# Patient Record
Sex: FEMALE | Race: WHITE | NOT HISPANIC OR LATINO
[De-identification: names, ages, dates, MRNs, and addresses within clinical notes are randomized per-mention and may not be internally consistent; named-entity substitution may affect disease eponyms.]

---

## 2017-10-14 ENCOUNTER — TRANSCRIPTION ENCOUNTER (OUTPATIENT)
Age: 57
End: 2017-10-14

## 2019-11-18 ENCOUNTER — TRANSCRIPTION ENCOUNTER (OUTPATIENT)
Age: 59
End: 2019-11-18

## 2020-01-31 ENCOUNTER — APPOINTMENT (OUTPATIENT)
Dept: OBGYN | Facility: CLINIC | Age: 60
End: 2020-01-31
Payer: COMMERCIAL

## 2020-01-31 VITALS
HEIGHT: 61.5 IN | SYSTOLIC BLOOD PRESSURE: 94 MMHG | BODY MASS INDEX: 24.98 KG/M2 | WEIGHT: 134 LBS | DIASTOLIC BLOOD PRESSURE: 62 MMHG

## 2020-01-31 DIAGNOSIS — Z84.1 FAMILY HISTORY OF DISORDERS OF KIDNEY AND URETER: ICD-10-CM

## 2020-01-31 DIAGNOSIS — Z83.1 FAMILY HISTORY OF OTHER INFECTIOUS AND PARASITIC DISEASES: ICD-10-CM

## 2020-01-31 DIAGNOSIS — Z78.9 OTHER SPECIFIED HEALTH STATUS: ICD-10-CM

## 2020-01-31 DIAGNOSIS — Z83.3 FAMILY HISTORY OF DIABETES MELLITUS: ICD-10-CM

## 2020-01-31 DIAGNOSIS — Z01.419 ENCOUNTER FOR GYNECOLOGICAL EXAMINATION (GENERAL) (ROUTINE) W/OUT ABNORMAL FINDINGS: ICD-10-CM

## 2020-01-31 PROCEDURE — 99386 PREV VISIT NEW AGE 40-64: CPT

## 2020-02-03 LAB — HPV HIGH+LOW RISK DNA PNL CVX: NOT DETECTED

## 2020-02-07 LAB — CYTOLOGY CVX/VAG DOC THIN PREP: NORMAL

## 2020-02-09 PROBLEM — Z83.1 FAMILY HISTORY OF TYPE C VIRAL HEPATITIS: Status: ACTIVE | Noted: 2020-02-09

## 2020-02-09 PROBLEM — Z84.1 FAMILY HISTORY OF KIDNEY DISEASE: Status: ACTIVE | Noted: 2020-02-09

## 2020-02-09 PROBLEM — Z83.3 FAMILY HISTORY OF TYPE 2 DIABETES MELLITUS: Status: ACTIVE | Noted: 2020-02-09

## 2020-02-09 PROBLEM — Z78.9 DOES NOT USE ILLICIT DRUGS: Status: ACTIVE | Noted: 2020-02-09

## 2020-02-09 PROBLEM — Z78.9 NON-SMOKER: Status: ACTIVE | Noted: 2020-02-09

## 2020-02-09 PROBLEM — Z78.9 SOCIAL ALCOHOL USE: Status: ACTIVE | Noted: 2020-02-09

## 2020-02-09 NOTE — PHYSICAL EXAM
[Acute Distress] : no acute distress [Awake] : awake [Alert] : alert [Mass] : no breast mass [Nipple Discharge] : no nipple discharge [Soft] : soft [Axillary LAD] : no axillary lymphadenopathy [Tender] : non tender [Oriented x3] : oriented to person, place, and time [No Bleeding] : there was no active vaginal bleeding [Normal] : uterus [Uterine Adnexae] : were not tender and not enlarged [External Hemorrhoid] : no external hemorrhoids

## 2020-02-09 NOTE — HISTORY OF PRESENT ILLNESS
[Regular Cycle Intervals] : periods have been regular [Menarche Age: ____] : age at menarche was [unfilled] [Menopause Age: ____] : age at menopause was [unfilled] [Sexually Active] : is sexually active [Monogamous] : is monogamous

## 2020-02-09 NOTE — REVIEW OF SYSTEMS
[Feeling Tired] : feeling tired [Recent Wt Gain ___ Lbs] : recent [unfilled] ~Ulb weight gain [Sight Problems] : sight problems [Sore Throat] : sore throat [Constipation] : constipation [Joint Pain] : joint pain [Joint Stiffness] : joint stiffness [Headache] : headache [Sleep Disturbances] : sleep disturbances [Hot Flashes] : hot flashes [Nl] : Endocrine

## 2021-03-05 ENCOUNTER — TRANSCRIPTION ENCOUNTER (OUTPATIENT)
Age: 61
End: 2021-03-05

## 2021-03-12 ENCOUNTER — TRANSCRIPTION ENCOUNTER (OUTPATIENT)
Age: 61
End: 2021-03-12

## 2021-05-05 ENCOUNTER — TRANSCRIPTION ENCOUNTER (OUTPATIENT)
Age: 61
End: 2021-05-05

## 2021-07-01 ENCOUNTER — RESULT REVIEW (OUTPATIENT)
Age: 61
End: 2021-07-01

## 2021-11-05 ENCOUNTER — RESULT REVIEW (OUTPATIENT)
Age: 61
End: 2021-11-05

## 2022-06-26 ENCOUNTER — NON-APPOINTMENT (OUTPATIENT)
Age: 62
End: 2022-06-26

## 2024-07-18 PROBLEM — R63.4 WEIGHT LOSS: Status: ACTIVE | Noted: 2024-07-18

## 2024-07-22 ENCOUNTER — APPOINTMENT (OUTPATIENT)
Dept: GASTROENTEROLOGY | Facility: HOSPITAL | Age: 64
End: 2024-07-22

## 2024-07-22 ENCOUNTER — NON-APPOINTMENT (OUTPATIENT)
Age: 64
End: 2024-07-22

## 2024-07-23 ENCOUNTER — APPOINTMENT (OUTPATIENT)
Dept: GASTROENTEROLOGY | Facility: CLINIC | Age: 64
End: 2024-07-23
Payer: COMMERCIAL

## 2024-07-23 VITALS
BODY MASS INDEX: 26.31 KG/M2 | SYSTOLIC BLOOD PRESSURE: 89 MMHG | HEART RATE: 78 BPM | TEMPERATURE: 97.1 F | OXYGEN SATURATION: 16 % | WEIGHT: 134 LBS | RESPIRATION RATE: 16 BRPM | HEIGHT: 60 IN | DIASTOLIC BLOOD PRESSURE: 58 MMHG

## 2024-07-23 DIAGNOSIS — R63.4 ABNORMAL WEIGHT LOSS: ICD-10-CM

## 2024-07-23 DIAGNOSIS — R18.0 MALIGNANT ASCITES: ICD-10-CM

## 2024-07-23 DIAGNOSIS — E43 UNSPECIFIED SEVERE PROTEIN-CALORIE MALNUTRITION: ICD-10-CM

## 2024-07-23 DIAGNOSIS — R13.12 DYSPHAGIA, OROPHARYNGEAL PHASE: ICD-10-CM

## 2024-07-23 PROCEDURE — 99205 OFFICE O/P NEW HI 60 MIN: CPT

## 2024-07-28 PROBLEM — R18.0 MALIGNANT ASCITES: Status: ACTIVE | Noted: 2024-07-28

## 2024-07-28 PROBLEM — R13.12 DYSPHAGIA, OROPHARYNGEAL: Status: ACTIVE | Noted: 2024-07-28

## 2024-07-28 PROBLEM — E43 SEVERE PROTEIN-CALORIE MALNUTRITION: Status: ACTIVE | Noted: 2024-07-28

## 2024-07-28 NOTE — PHYSICAL EXAM
[Alert] : alert [No Acute Distress] : no acute distress [Sclera] : the sclera and conjunctiva were normal [Normal Appearance] : the appearance of the neck was normal [No Respiratory Distress] : no respiratory distress [Normal S1, S2] : normal S1 and S2 [Normal Color / Pigmentation] : normal skin color and pigmentation [No Focal Deficits] : no focal deficits [Normal Affect] : the affect was normal [Normal Mood] : the mood was normal [de-identified] : thin

## 2024-07-28 NOTE — CONSULT LETTER
[Dear  ___] : Dear  [unfilled], [Consult Letter:] : I had the pleasure of evaluating your patient, [unfilled]. [Please see my note below.] : Please see my note below. [Consult Closing:] : Thank you very much for allowing me to participate in the care of this patient.  If you have any questions, please do not hesitate to contact me. [Sincerely,] : Sincerely, [FreeTextEntry3] : Sean Connolly MD, MPH, ELIDA, AARONG Chief of Clinical Quality in Gastroenterology, [facility] Associate Chief of Gastroenterology, [facility] Interim Director of Endoscopy, [address], [address] [facility], [id number] 24 hours [phone]

## 2024-07-28 NOTE — REVIEW OF SYSTEMS
[Recent Weight Loss (___ Lbs)] : recent [unfilled] ~Ulb weight loss [Fever] : no fever [Chills] : no chills [Chest Pain] : no chest pain [Shortness Of Breath] : no shortness of breath [FreeTextEntry7] : Recurrent ascites, has peritoneal catheter drain

## 2024-07-28 NOTE — ASSESSMENT
[FreeTextEntry1] : Impression:  #1.  Referred for PEG for malnutrition, weight loss in setting of advanced malignancy  #2.  Oropharyngeal dysphagia, suspected mucositis   #3.  Malignant ascites with drain  Plan:  #1.  ENT evaluation  #2,  EGD/PEG in 17-20 days per med oncology.  Diagram was used for educational purposes.  Elevated risk of complications in the setting of malignant ascites, malnutrition from cancer, and poor wound healing were extensively discussed.  Risks, benefits, alternatives of the procedure were discussed with the patient and family member and they were educated about the procedure. Risks include, but are not limited to, bleeding, infection, injury to internal organs, possible need for further procedures including emergency surgery, leakage of ascites, peritonitis, clogged tube, malposiitioned/dislodged tube, missed lesions, risk of anesthesia, and risk of IV site problems.  Patient and family member understand and agrees to proceed.

## 2024-07-28 NOTE — PHYSICAL EXAM
[Alert] : alert [No Acute Distress] : no acute distress [Sclera] : the sclera and conjunctiva were normal [Normal Appearance] : the appearance of the neck was normal [No Respiratory Distress] : no respiratory distress [Normal S1, S2] : normal S1 and S2 [Normal Color / Pigmentation] : normal skin color and pigmentation [No Focal Deficits] : no focal deficits [Normal Affect] : the affect was normal [Normal Mood] : the mood was normal [de-identified] : thin

## 2024-07-28 NOTE — CONSULT LETTER
[Dear  ___] : Dear  [unfilled], [Consult Letter:] : I had the pleasure of evaluating your patient, [unfilled]. [Please see my note below.] : Please see my note below. [Consult Closing:] : Thank you very much for allowing me to participate in the care of this patient.  If you have any questions, please do not hesitate to contact me. [Sincerely,] : Sincerely, [FreeTextEntry3] : Sean Connolly MD, MPH, ELIDA, AARONG Chief of Clinical Quality in Gastroenterology, [facility] Associate Chief of Gastroenterology, [facility] Interim Director of Endoscopy, [address], [address] [facility], [id number] 24 hours [phone]

## 2024-07-28 NOTE — HISTORY OF PRESENT ILLNESS
[FreeTextEntry1] : Referred for PEG for malnutrition, weight loss in setting of advanced malignancy  Oropharyngeal dysphagia, suspected mucositis   Malignant ascites with drain  No fevers, chills, sweats, abdominal pain, nausea, vomiting, diarrhea, melena, hematochezia, jaundice or weight loss.  No chest pain/dyspnea.

## 2024-07-30 ENCOUNTER — OUTPATIENT (OUTPATIENT)
Dept: OUTPATIENT SERVICES | Facility: HOSPITAL | Age: 64
LOS: 1 days | End: 2024-07-30
Payer: COMMERCIAL

## 2024-07-30 VITALS
DIASTOLIC BLOOD PRESSURE: 59 MMHG | TEMPERATURE: 97 F | OXYGEN SATURATION: 98 % | HEART RATE: 86 BPM | WEIGHT: 82.01 LBS | HEIGHT: 61 IN | SYSTOLIC BLOOD PRESSURE: 84 MMHG | RESPIRATION RATE: 18 BRPM

## 2024-07-30 DIAGNOSIS — Z98.890 OTHER SPECIFIED POSTPROCEDURAL STATES: Chronic | ICD-10-CM

## 2024-07-30 DIAGNOSIS — Z01.818 ENCOUNTER FOR OTHER PREPROCEDURAL EXAMINATION: ICD-10-CM

## 2024-07-30 DIAGNOSIS — E43 UNSPECIFIED SEVERE PROTEIN-CALORIE MALNUTRITION: ICD-10-CM

## 2024-07-30 DIAGNOSIS — R63.4 ABNORMAL WEIGHT LOSS: ICD-10-CM

## 2024-07-30 DIAGNOSIS — M79.89 OTHER SPECIFIED SOFT TISSUE DISORDERS: ICD-10-CM

## 2024-07-30 DIAGNOSIS — D17.9 BENIGN LIPOMATOUS NEOPLASM, UNSPECIFIED: Chronic | ICD-10-CM

## 2024-07-30 DIAGNOSIS — Z95.828 PRESENCE OF OTHER VASCULAR IMPLANTS AND GRAFTS: Chronic | ICD-10-CM

## 2024-07-30 LAB
ALBUMIN SERPL ELPH-MCNC: 3.1 G/DL — LOW (ref 3.3–5)
ALP SERPL-CCNC: 67 U/L — SIGNIFICANT CHANGE UP (ref 40–120)
ALT FLD-CCNC: 14 U/L — SIGNIFICANT CHANGE UP (ref 10–45)
ANION GAP SERPL CALC-SCNC: 9 MMOL/L — SIGNIFICANT CHANGE UP (ref 5–17)
AST SERPL-CCNC: 19 U/L — SIGNIFICANT CHANGE UP (ref 10–40)
BILIRUB SERPL-MCNC: 0.2 MG/DL — SIGNIFICANT CHANGE UP (ref 0.2–1.2)
BUN SERPL-MCNC: 9 MG/DL — SIGNIFICANT CHANGE UP (ref 7–23)
CALCIUM SERPL-MCNC: 9.1 MG/DL — SIGNIFICANT CHANGE UP (ref 8.4–10.5)
CHLORIDE SERPL-SCNC: 105 MMOL/L — SIGNIFICANT CHANGE UP (ref 96–108)
CO2 SERPL-SCNC: 24 MMOL/L — SIGNIFICANT CHANGE UP (ref 22–31)
CREAT SERPL-MCNC: 0.68 MG/DL — SIGNIFICANT CHANGE UP (ref 0.5–1.3)
EGFR: 98 ML/MIN/1.73M2 — SIGNIFICANT CHANGE UP
GLUCOSE SERPL-MCNC: 95 MG/DL — SIGNIFICANT CHANGE UP (ref 70–99)
HCT VFR BLD CALC: 30.2 % — LOW (ref 34.5–45)
HGB BLD-MCNC: 9.5 G/DL — LOW (ref 11.5–15.5)
MCHC RBC-ENTMCNC: 28.6 PG — SIGNIFICANT CHANGE UP (ref 27–34)
MCHC RBC-ENTMCNC: 31.5 GM/DL — LOW (ref 32–36)
MCV RBC AUTO: 91 FL — SIGNIFICANT CHANGE UP (ref 80–100)
NRBC # BLD: 0 /100 WBCS — SIGNIFICANT CHANGE UP (ref 0–0)
PLATELET # BLD AUTO: 265 K/UL — SIGNIFICANT CHANGE UP (ref 150–400)
POTASSIUM SERPL-MCNC: 4.5 MMOL/L — SIGNIFICANT CHANGE UP (ref 3.5–5.3)
POTASSIUM SERPL-SCNC: 4.5 MMOL/L — SIGNIFICANT CHANGE UP (ref 3.5–5.3)
PROT SERPL-MCNC: 5.1 G/DL — LOW (ref 6–8.3)
RBC # BLD: 3.32 M/UL — LOW (ref 3.8–5.2)
RBC # FLD: 23.5 % — HIGH (ref 10.3–14.5)
SODIUM SERPL-SCNC: 138 MMOL/L — SIGNIFICANT CHANGE UP (ref 135–145)
WBC # BLD: 4.08 K/UL — SIGNIFICANT CHANGE UP (ref 3.8–10.5)
WBC # FLD AUTO: 4.08 K/UL — SIGNIFICANT CHANGE UP (ref 3.8–10.5)

## 2024-07-30 PROCEDURE — 36415 COLL VENOUS BLD VENIPUNCTURE: CPT

## 2024-07-30 PROCEDURE — 93970 EXTREMITY STUDY: CPT | Mod: 26

## 2024-07-30 PROCEDURE — 80053 COMPREHEN METABOLIC PANEL: CPT

## 2024-07-30 PROCEDURE — 93970 EXTREMITY STUDY: CPT

## 2024-07-30 PROCEDURE — 85027 COMPLETE CBC AUTOMATED: CPT

## 2024-07-30 PROCEDURE — G0463: CPT

## 2024-07-30 NOTE — H&P PST ADULT - NSICDXPASTMEDICALHX_GEN_ALL_CORE_FT
PAST MEDICAL HISTORY:  Constipation     Dysphagia     Infected venous access port     Lung cancer     Migraine     Severe malnutrition      PAST MEDICAL HISTORY:  Asthma     Constipation     Diverticulosis     Dysphagia     Infected venous access port     Lung cancer     Migraine     Severe malnutrition     Shoulder mass

## 2024-07-30 NOTE — H&P PST ADULT - ASSESSMENT
DASI score: 5.07  DASI activity: helps babysit 2 year old granddaughter, mod house chores - gets fatigued/sob   Loose teeth or denture: denies

## 2024-07-30 NOTE — H&P PST ADULT - NSICDXPASTSURGICALHX_GEN_ALL_CORE_FT
PAST SURGICAL HISTORY:  H/O insertion of central venous access port     History of appendectomy     Lipoma      PAST SURGICAL HISTORY:  H/O insertion of central venous access port     H/O shoulder surgery     History of appendectomy     History of thoracentesis     Lipoma

## 2024-07-30 NOTE — H&P PST ADULT - NSICDXFAMILYHX_GEN_ALL_CORE_FT
FAMILY HISTORY:  Father  Still living? Unknown  FH: HTN (hypertension), Age at diagnosis: Age Unknown     FAMILY HISTORY:  FH: diabetes mellitus    Father  Still living? Unknown  FH: HTN (hypertension), Age at diagnosis: Age Unknown

## 2024-07-30 NOTE — H&P PST ADULT - BP NONINVASIVE SYSTOLIC (MM HG)
[de-identified] : Is S/P second Carboplatin and Taxol, tolerating well, working full time, only one episode of nausea, took compazine and this resolved. [de-identified] : \par Recurrent endometrial cancer with widespread metastatic disease involving the liver, spleen, lung, and peritoneum. \par Initially treated with Carboplatin and Taxol in 2018, recurrent disease in July 2019, retreated with Craboplatin and Taxol.\par  85

## 2024-07-30 NOTE — H&P PST ADULT - PROBLEM SELECTOR PLAN 1
EGD/PEG placement on 8/5/24 with Dr. tSephane Rangel.  Pre-op instructions given. Questions answered. EGD/PEG placement on 8/5/24 with Dr. Stephane Rangel.  Pre-op instructions given. Questions answered.

## 2024-07-30 NOTE — H&P PST ADULT - HISTORY OF PRESENT ILLNESS
62 yo female presents to PST prior to scheduled EGD/Peg placement on 8/5/24 with Dr. Stephane Rangel. Pmhx includes lung cancer since 10/2021; on chemotherapy (2 weeks on 2 weeks off; next treatment is 7/31/24, no treatment week of surgery). Reports unintentional weight loss (40+lbs) over the past 6 months; has intermittent nausea and dysphagia (mira. when has sores in mouth from chemo). Hx of hypotension (84/59 at PST); chronic fatigue and fernandez (reports stable, recent echo with oncologist). Currently, denies pain/sores in mouth, fever, chills, chest pain, shortness of breath at rest, dizziness, syncope.    Upon exam has swelling in BLE, left greater than right. Denies calf pain, redness. BLE venous doppler ordered, will be performed today. 64 yo female presents to PST prior to scheduled EGD/Peg placement on 8/5/24 with Dr. Stephane Rangel. Pmhx includes asthma (pt reports stable, no inhaler use), metastatic lung cancer since 10/2021; on chemotherapy (2 weeks on 2 weeks off; next treatment is 7/31/24, no treatment week of surgery). Reports unintentional weight loss (40+lbs) over the past 6 months; has intermittent nausea and dysphagia (mira. when has sores in mouth from chemo). Hx of hypotension (84/59 at PST); chronic fatigue and fernandez (reports stable, echo on 7/2/24 EF 70%). Currently, denies pain/sores in mouth, fever, chills, chest pain, shortness of breath at rest, dizziness, syncope.    Upon exam has swelling in BLE, left greater than right. Denies calf pain, redness. BLE venous doppler ordered, will be performed today.   * 7/30/24 - Doppler negative for lower extremity DVT. Results sent to oncologist, communicated w/ patient and surgeon.

## 2024-07-30 NOTE — H&P PST ADULT - ADMIT DATE
Abdomen soft, tender mid/lower abdomen. skin in abdominal wound appears erythematous/inflammed with scant non purulent discharge.
30-Jul-2024

## 2024-08-05 ENCOUNTER — INPATIENT (INPATIENT)
Facility: HOSPITAL | Age: 64
LOS: 3 days | Discharge: HOME | DRG: 641 | End: 2024-08-09
Attending: STUDENT IN AN ORGANIZED HEALTH CARE EDUCATION/TRAINING PROGRAM | Admitting: INTERNAL MEDICINE
Payer: COMMERCIAL

## 2024-08-05 ENCOUNTER — APPOINTMENT (OUTPATIENT)
Dept: GASTROENTEROLOGY | Facility: HOSPITAL | Age: 64
End: 2024-08-05

## 2024-08-05 VITALS
SYSTOLIC BLOOD PRESSURE: 95 MMHG | WEIGHT: 82.01 LBS | DIASTOLIC BLOOD PRESSURE: 54 MMHG | OXYGEN SATURATION: 94 % | HEIGHT: 61 IN | HEART RATE: 84 BPM | TEMPERATURE: 97 F | RESPIRATION RATE: 15 BRPM

## 2024-08-05 DIAGNOSIS — C34.90 MALIGNANT NEOPLASM OF UNSPECIFIED PART OF UNSPECIFIED BRONCHUS OR LUNG: ICD-10-CM

## 2024-08-05 DIAGNOSIS — Z29.9 ENCOUNTER FOR PROPHYLACTIC MEASURES, UNSPECIFIED: ICD-10-CM

## 2024-08-05 DIAGNOSIS — D17.9 BENIGN LIPOMATOUS NEOPLASM, UNSPECIFIED: Chronic | ICD-10-CM

## 2024-08-05 DIAGNOSIS — R63.4 ABNORMAL WEIGHT LOSS: ICD-10-CM

## 2024-08-05 DIAGNOSIS — Z93.1 GASTROSTOMY STATUS: ICD-10-CM

## 2024-08-05 DIAGNOSIS — I95.9 HYPOTENSION, UNSPECIFIED: ICD-10-CM

## 2024-08-05 DIAGNOSIS — Z95.828 PRESENCE OF OTHER VASCULAR IMPLANTS AND GRAFTS: Chronic | ICD-10-CM

## 2024-08-05 DIAGNOSIS — Z98.890 OTHER SPECIFIED POSTPROCEDURAL STATES: Chronic | ICD-10-CM

## 2024-08-05 DIAGNOSIS — R13.10 DYSPHAGIA, UNSPECIFIED: ICD-10-CM

## 2024-08-05 DIAGNOSIS — R18.0 MALIGNANT ASCITES: ICD-10-CM

## 2024-08-05 LAB
ALBUMIN SERPL ELPH-MCNC: 0.9 G/DL — LOW (ref 3.3–5)
ALBUMIN SERPL ELPH-MCNC: 2.6 G/DL — LOW (ref 3.3–5)
ALP SERPL-CCNC: 18 U/L — LOW (ref 40–120)
ALP SERPL-CCNC: 56 U/L — SIGNIFICANT CHANGE UP (ref 40–120)
ALT FLD-CCNC: 11 U/L — SIGNIFICANT CHANGE UP (ref 10–45)
ALT FLD-CCNC: <5 U/L — LOW (ref 10–45)
ANION GAP SERPL CALC-SCNC: 11 MMOL/L — SIGNIFICANT CHANGE UP (ref 5–17)
ANION GAP SERPL CALC-SCNC: 5 MMOL/L — SIGNIFICANT CHANGE UP (ref 5–17)
ANISOCYTOSIS BLD QL: SLIGHT — SIGNIFICANT CHANGE UP
AST SERPL-CCNC: 13 U/L — SIGNIFICANT CHANGE UP (ref 10–40)
AST SERPL-CCNC: 6 U/L — LOW (ref 10–40)
BASOPHILS # BLD AUTO: 0.05 K/UL — SIGNIFICANT CHANGE UP (ref 0–0.2)
BASOPHILS # BLD AUTO: SIGNIFICANT CHANGE UP K/UL (ref 0–0.2)
BASOPHILS NFR BLD AUTO: 0.9 % — SIGNIFICANT CHANGE UP (ref 0–2)
BASOPHILS NFR BLD AUTO: SIGNIFICANT CHANGE UP % (ref 0–2)
BILIRUB SERPL-MCNC: 0.2 MG/DL — SIGNIFICANT CHANGE UP (ref 0.2–1.2)
BILIRUB SERPL-MCNC: <0.1 MG/DL — LOW (ref 0.2–1.2)
BUN SERPL-MCNC: 8 MG/DL — SIGNIFICANT CHANGE UP (ref 7–23)
BUN SERPL-MCNC: <4 MG/DL — LOW (ref 7–23)
CALCIUM SERPL-MCNC: 7.8 MG/DL — LOW (ref 8.4–10.5)
CALCIUM SERPL-MCNC: <3 MG/DL — CRITICAL LOW (ref 8.4–10.5)
CHLORIDE SERPL-SCNC: 108 MMOL/L — SIGNIFICANT CHANGE UP (ref 96–108)
CHLORIDE SERPL-SCNC: 132 MMOL/L — HIGH (ref 96–108)
CO2 SERPL-SCNC: 22 MMOL/L — SIGNIFICANT CHANGE UP (ref 22–31)
CO2 SERPL-SCNC: <10 MMOL/L — CRITICAL LOW (ref 22–31)
CREAT SERPL-MCNC: 0.68 MG/DL — SIGNIFICANT CHANGE UP (ref 0.5–1.3)
CREAT SERPL-MCNC: <0.3 MG/DL — LOW (ref 0.5–1.3)
DACRYOCYTES BLD QL SMEAR: SLIGHT — SIGNIFICANT CHANGE UP
EGFR: 120 ML/MIN/1.73M2 — SIGNIFICANT CHANGE UP
EGFR: 98 ML/MIN/1.73M2 — SIGNIFICANT CHANGE UP
ELLIPTOCYTES BLD QL SMEAR: SLIGHT — SIGNIFICANT CHANGE UP
EOSINOPHIL # BLD AUTO: 0 K/UL — SIGNIFICANT CHANGE UP (ref 0–0.5)
EOSINOPHIL # BLD AUTO: SIGNIFICANT CHANGE UP K/UL (ref 0–0.5)
EOSINOPHIL NFR BLD AUTO: 0 % — SIGNIFICANT CHANGE UP (ref 0–6)
EOSINOPHIL NFR BLD AUTO: SIGNIFICANT CHANGE UP % (ref 0–6)
GLUCOSE SERPL-MCNC: 33 MG/DL — CRITICAL LOW (ref 70–99)
GLUCOSE SERPL-MCNC: 83 MG/DL — SIGNIFICANT CHANGE UP (ref 70–99)
HCT VFR BLD CALC: 27.4 % — LOW (ref 34.5–45)
HCT VFR BLD CALC: SIGNIFICANT CHANGE UP (ref 34.5–45)
HGB BLD-MCNC: 8.5 G/DL — LOW (ref 11.5–15.5)
HGB BLD-MCNC: SIGNIFICANT CHANGE UP (ref 11.5–15.5)
IMM GRANULOCYTES NFR BLD AUTO: SIGNIFICANT CHANGE UP % (ref 0–0.9)
LYMPHOCYTES # BLD AUTO: 0.82 K/UL — LOW (ref 1–3.3)
LYMPHOCYTES # BLD AUTO: 15 % — SIGNIFICANT CHANGE UP (ref 13–44)
LYMPHOCYTES # BLD AUTO: SIGNIFICANT CHANGE UP (ref 13–44)
LYMPHOCYTES # BLD AUTO: SIGNIFICANT CHANGE UP K/UL (ref 1–3.3)
MACROCYTES BLD QL: SLIGHT — SIGNIFICANT CHANGE UP
MAGNESIUM SERPL-MCNC: 0.6 MG/DL — CRITICAL LOW (ref 1.6–2.6)
MAGNESIUM SERPL-MCNC: 1.7 MG/DL — SIGNIFICANT CHANGE UP (ref 1.6–2.6)
MANUAL SMEAR VERIFICATION: SIGNIFICANT CHANGE UP
MCHC RBC-ENTMCNC: 28.9 PG — SIGNIFICANT CHANGE UP (ref 27–34)
MCHC RBC-ENTMCNC: 31 GM/DL — LOW (ref 32–36)
MCHC RBC-ENTMCNC: SIGNIFICANT CHANGE UP (ref 27–34)
MCHC RBC-ENTMCNC: SIGNIFICANT CHANGE UP (ref 32–36)
MCV RBC AUTO: 93.2 FL — SIGNIFICANT CHANGE UP (ref 80–100)
MCV RBC AUTO: SIGNIFICANT CHANGE UP (ref 80–100)
MONOCYTES # BLD AUTO: 0.1 K/UL — SIGNIFICANT CHANGE UP (ref 0–0.9)
MONOCYTES # BLD AUTO: SIGNIFICANT CHANGE UP K/UL (ref 0–0.9)
MONOCYTES NFR BLD AUTO: 1.8 % — LOW (ref 2–14)
MONOCYTES NFR BLD AUTO: SIGNIFICANT CHANGE UP % (ref 2–14)
NEUTROPHILS # BLD AUTO: 4.5 K/UL — SIGNIFICANT CHANGE UP (ref 1.8–7.4)
NEUTROPHILS # BLD AUTO: SIGNIFICANT CHANGE UP K/UL (ref 1.8–7.4)
NEUTROPHILS NFR BLD AUTO: 80.5 % — HIGH (ref 43–77)
NEUTROPHILS NFR BLD AUTO: SIGNIFICANT CHANGE UP (ref 43–77)
NEUTS BAND # BLD: 1.8 % — SIGNIFICANT CHANGE UP (ref 0–8)
NRBC # BLD: SIGNIFICANT CHANGE UP /100 WBCS (ref 0–0)
OVALOCYTES BLD QL SMEAR: SLIGHT — SIGNIFICANT CHANGE UP
PHOSPHATE SERPL-MCNC: 1.2 MG/DL — LOW (ref 2.5–4.5)
PHOSPHATE SERPL-MCNC: 3.8 MG/DL — SIGNIFICANT CHANGE UP (ref 2.5–4.5)
PLAT MORPH BLD: NORMAL — SIGNIFICANT CHANGE UP
PLATELET # BLD AUTO: 179 K/UL — SIGNIFICANT CHANGE UP (ref 150–400)
PLATELET # BLD AUTO: SIGNIFICANT CHANGE UP K/UL (ref 150–400)
POIKILOCYTOSIS BLD QL AUTO: SIGNIFICANT CHANGE UP
POTASSIUM SERPL-MCNC: 3.7 MMOL/L — SIGNIFICANT CHANGE UP (ref 3.5–5.3)
POTASSIUM SERPL-MCNC: <2 MMOL/L — CRITICAL LOW (ref 3.5–5.3)
POTASSIUM SERPL-SCNC: 3.7 MMOL/L — SIGNIFICANT CHANGE UP (ref 3.5–5.3)
POTASSIUM SERPL-SCNC: <2 MMOL/L — CRITICAL LOW (ref 3.5–5.3)
PROT SERPL-MCNC: 1.3 G/DL — LOW (ref 6–8.3)
PROT SERPL-MCNC: 4.2 G/DL — LOW (ref 6–8.3)
RBC # BLD: 2.94 M/UL — LOW (ref 3.8–5.2)
RBC # BLD: SIGNIFICANT CHANGE UP M/UL (ref 3.8–5.2)
RBC # FLD: 22.9 % — HIGH (ref 10.3–14.5)
RBC # FLD: SIGNIFICANT CHANGE UP (ref 10.3–14.5)
RBC BLD AUTO: ABNORMAL
SCHISTOCYTES BLD QL AUTO: SLIGHT — SIGNIFICANT CHANGE UP
SODIUM SERPL-SCNC: 141 MMOL/L — SIGNIFICANT CHANGE UP (ref 135–145)
SODIUM SERPL-SCNC: 146 MMOL/L — HIGH (ref 135–145)
WBC # BLD: 5.47 K/UL — SIGNIFICANT CHANGE UP (ref 3.8–10.5)
WBC # BLD: SIGNIFICANT CHANGE UP K/UL (ref 3.8–10.5)
WBC # FLD AUTO: 5.47 K/UL — SIGNIFICANT CHANGE UP (ref 3.8–10.5)
WBC # FLD AUTO: SIGNIFICANT CHANGE UP K/UL (ref 3.8–10.5)

## 2024-08-05 PROCEDURE — 99497 ADVNCD CARE PLAN 30 MIN: CPT | Mod: 25

## 2024-08-05 PROCEDURE — 99221 1ST HOSP IP/OBS SF/LOW 40: CPT | Mod: GC,25

## 2024-08-05 PROCEDURE — 99223 1ST HOSP IP/OBS HIGH 75: CPT

## 2024-08-05 DEVICE — KIT ENDO SAFTEY PEG PULL STD 20 FR ENDOVIVE: Type: IMPLANTABLE DEVICE | Status: FUNCTIONAL

## 2024-08-05 RX ORDER — ACETAMINOPHEN 500 MG
975 TABLET ORAL EVERY 6 HOURS
Refills: 0 | Status: DISCONTINUED | OUTPATIENT
Start: 2024-08-05 | End: 2024-08-05

## 2024-08-05 RX ORDER — GUAIFEN/P-PROPANOLAMIN/CODEINE
1 EXPECTORANT ORAL
Refills: 0 | DISCHARGE

## 2024-08-05 RX ORDER — SENNOSIDES 8.6 MG/1
2 TABLET ORAL AT BEDTIME
Refills: 0 | Status: DISCONTINUED | OUTPATIENT
Start: 2024-08-05 | End: 2024-08-05

## 2024-08-05 RX ORDER — ACETAMINOPHEN 500 MG
650 TABLET ORAL EVERY 6 HOURS
Refills: 0 | Status: DISCONTINUED | OUTPATIENT
Start: 2024-08-05 | End: 2024-08-09

## 2024-08-05 RX ORDER — METOCLOPRAMIDE HCL 5 MG/ML
1 VIAL (ML) INJECTION
Refills: 0 | DISCHARGE

## 2024-08-05 RX ORDER — ONDANSETRON HCL/PF 4 MG/2 ML
4 VIAL (ML) INJECTION EVERY 8 HOURS
Refills: 0 | Status: DISCONTINUED | OUTPATIENT
Start: 2024-08-05 | End: 2024-08-09

## 2024-08-05 RX ORDER — GUAIFEN/P-PROPANOLAMIN/CODEINE
5 EXPECTORANT ORAL AT BEDTIME
Refills: 0 | Status: DISCONTINUED | OUTPATIENT
Start: 2024-08-05 | End: 2024-08-05

## 2024-08-05 RX ORDER — MAGNESIUM SULFATE 500 MG/ML
2 VIAL (ML) INJECTION
Refills: 0 | Status: DISCONTINUED | OUTPATIENT
Start: 2024-08-05 | End: 2024-08-05

## 2024-08-05 RX ORDER — MAGNESIUM, ALUMINUM HYDROXIDE 200-225/5
30 SUSPENSION, ORAL (FINAL DOSE FORM) ORAL EVERY 4 HOURS
Refills: 0 | Status: DISCONTINUED | OUTPATIENT
Start: 2024-08-05 | End: 2024-08-09

## 2024-08-05 RX ORDER — MELATONIN 3 MG
3 TABLET ORAL AT BEDTIME
Refills: 0 | Status: DISCONTINUED | OUTPATIENT
Start: 2024-08-05 | End: 2024-08-09

## 2024-08-05 RX ORDER — MAGNESIUM, ALUMINUM HYDROXIDE 200-225/5
30 SUSPENSION, ORAL (FINAL DOSE FORM) ORAL EVERY 4 HOURS
Refills: 0 | Status: DISCONTINUED | OUTPATIENT
Start: 2024-08-05 | End: 2024-08-05

## 2024-08-05 RX ORDER — PANTOPRAZOLE SODIUM 20 MG/1
40 TABLET, DELAYED RELEASE ORAL DAILY
Refills: 0 | Status: DISCONTINUED | OUTPATIENT
Start: 2024-08-05 | End: 2024-08-09

## 2024-08-05 RX ORDER — ONDANSETRON HCL/PF 4 MG/2 ML
8 VIAL (ML) INJECTION EVERY 8 HOURS
Refills: 0 | Status: DISCONTINUED | OUTPATIENT
Start: 2024-08-05 | End: 2024-08-05

## 2024-08-05 RX ORDER — PANTOPRAZOLE SODIUM 20 MG/1
40 TABLET, DELAYED RELEASE ORAL
Refills: 0 | Status: DISCONTINUED | OUTPATIENT
Start: 2024-08-05 | End: 2024-08-05

## 2024-08-05 RX ORDER — ENOXAPARIN SODIUM 120 MG/.8ML
40 INJECTION SUBCUTANEOUS EVERY 24 HOURS
Refills: 0 | Status: DISCONTINUED | OUTPATIENT
Start: 2024-08-05 | End: 2024-08-09

## 2024-08-05 RX ORDER — METOCLOPRAMIDE HCL 5 MG/ML
10 VIAL (ML) INJECTION EVERY 8 HOURS
Refills: 0 | Status: DISCONTINUED | OUTPATIENT
Start: 2024-08-05 | End: 2024-08-05

## 2024-08-05 RX ORDER — ONDANSETRON HCL/PF 4 MG/2 ML
4 VIAL (ML) INJECTION EVERY 8 HOURS
Refills: 0 | Status: DISCONTINUED | OUTPATIENT
Start: 2024-08-05 | End: 2024-08-05

## 2024-08-05 RX ORDER — LORATADINE 10 MG
17 TABLET,DISINTEGRATING ORAL DAILY
Refills: 0 | Status: DISCONTINUED | OUTPATIENT
Start: 2024-08-05 | End: 2024-08-05

## 2024-08-05 RX ORDER — CLINDAMYCIN PHOSPHATE 150 MG/ML
600 VIAL (ML) INJECTION ONCE
Refills: 0 | Status: DISCONTINUED | OUTPATIENT
Start: 2024-08-05 | End: 2024-08-09

## 2024-08-05 RX ORDER — MELATONIN 3 MG
3 TABLET ORAL AT BEDTIME
Refills: 0 | Status: DISCONTINUED | OUTPATIENT
Start: 2024-08-05 | End: 2024-08-05

## 2024-08-05 RX ORDER — CALCIUM CARBONATE 500(1250)
1 TABLET ORAL
Refills: 0 | DISCHARGE

## 2024-08-05 RX ORDER — SENNOSIDES 8.6 MG/1
2 TABLET ORAL
Refills: 0 | DISCHARGE

## 2024-08-05 RX ORDER — GUAIFEN/P-PROPANOLAMIN/CODEINE
5 EXPECTORANT ORAL AT BEDTIME
Refills: 0 | Status: DISCONTINUED | OUTPATIENT
Start: 2024-08-05 | End: 2024-08-09

## 2024-08-05 RX ADMIN — Medication 650 MILLIGRAM(S): at 22:23

## 2024-08-05 RX ADMIN — Medication 650 MILLIGRAM(S): at 23:23

## 2024-08-05 NOTE — H&P ADULT - PROBLEM SELECTOR PLAN 3
alert
Reports progressing through 3 lines of therapy, now on an experiemental agent  - [place] [place]

## 2024-08-05 NOTE — H&P ADULT - PROBLEM SELECTOR PLAN 1
Per patient and , her oncologist Dr. Stanford ([facility] [place]) recommended PEG placement in the setting of malnutrition and rapid weight loss; the patient does report that she does not have difficulty swallowing and normally is able to consume a soft diet and water  - per the patient, will continue PO diet for now  - supplemental tube feedings to commence following nutrition recs, nutrition consult to be placed
What causes cough, runny nose, and other symptoms of the common cold?  These symptoms are usually caused by a virus. Doctors also use the term "viral upper respiratory infection" or "viral URI." Lots of different viruses can get into your nose, mouth, throat, or airways and cause cold symptoms.    Most people get better from a cold without any lasting problems. Even so, having a cold can be uncomfortable.    What are the symptoms of the common cold?  Symptoms can include:    ?Sneezing    ?Coughing    ?Sniffling and runny nose    ?Sore throat    ?Chest congestion    In children, the common cold can also cause a fever. But adults do not usually get a fever when they have a cold.    Colds usually last about 3 to 7 days in adults and 10 days in children. But some people have symptoms for up to 2 weeks.    How can I tell if I have a cold or something else?  Sometimes, it can be hard to tell if you have a cold or something else. Some cold symptoms can also be caused by other illnesses, such as coronavirus disease 2019 ("COVID-19"), the flu, or strep throat.    There are sometimes clues that can help you tell the difference:    ?COVID-19 often starts out very similar to a cold, although it can also cause a fever. If you have cold symptoms and have been around someone with COVID-19, you should get a test to find out if you have it, too.    ?The flu is more likely to cause fever, body aches, and extreme tiredness than a cold.    ?Strep throat usually causes severe throat pain. It can also cause a fever and swollen glands in the neck. People with strep throat usually do not have other cold symptoms like a stuffy nose or cough.    If you think that you might have an illness other than the common cold, call your doctor or nurse. They can tell you what to do.    Can medicine help with a cold?  Usually, a cold gets better on its own and does not need treatment. Because colds are usually caused by viruses, antibiotics will not help.    If you are a teen or an adult, you can try cough and cold medicines that you can get without a prescription. These medicines might help with your symptoms. But they can't cure your cold, or help you get well faster.    If you decide to try non-prescription cold medicines:    ?Read the directions on the label, and follow them carefully.    ?Do not combine 2 or more medicines that have acetaminophen in them. If you take too much acetaminophen, it can damage your liver.    ?If you have a heart condition, have high blood pressure, or take any prescription medicines, talk to your doctor or pharmacist before taking cold medicine. They can tell you which medicines are safe.    Some medicines are not safe for children:    ?If your child is younger than 6, do not give them any cold medicines. These medicines are not safe for young children. Even if your child is older than 6, cough and cold medicines are unlikely to help.    ?Never give aspirin to any child younger than 18 years old. In children, aspirin can cause a life-threatening condition called Reye syndrome.    ?When giving your child acetaminophen or other non-prescription medicines, never give more than the recommended dose.    Is there anything I can do on my own to feel better?  Yes. You can:    ?Get plenty of rest.    ?Drink lots of fluids (water, juice, or broth) to stay hydrated. This will help replace any fluids lost if you have a runny nose or sweating from a fever. Warm tea or soup can help soothe a sore throat.    ?If the air in your home feels dry, use a cool-mist humidifier. This can help a stuffy nose and make it easier to breathe.    ?Use saline nose drops or spray to relieve stuffiness.    ?Avoid smoking, and stay away from places where people are smoking.    Can the common cold lead to more serious problems?  In some cases, yes. In some people, having a cold can lead to:    ?Ear infections    ?Worse asthma symptoms    ?Sinus infections    ?Pneumonia or bronchitis (infections of the lungs)    Can colds be prevented?  There are some things you can do to keep germs from spreading:    ?Wash your hands with soap and water often – This can also help prevent the spread of other illnesses like the flu and COVID-19. The table has instructions on how to wash your hands to prevent spreading illness (table 1).    ?Cover your cough – Cough into your elbow instead of your hands. Teach children to do this, too. Throw away used tissues right away.    ?Clean surfaces – The germs that cause the common cold can live on tables, door handles, and other surfaces for at least 2 hours.    ?Stay home if you are sick – When you do need to be around other people, consider wearing a face mask until you are feeling better.    When should I call the doctor?  Contact your doctor or nurse if you:    ?Lose your sense of taste or smell    ?Have a fever of more than 100.4°F (38°C) that comes with shaking chills, loss of appetite, or trouble breathing    ?Have a very bad sore throat    ?Have a fever and also have lung disease, such as emphysema or asthma    ?Have a cough that lasts longer than 10 days or starts getting worse    ?Have chest pain when you cough or breathe deeply, have trouble breathing, or cough up blood    If you are older than 65, or if you have any chronic medical conditions such as diabetes, contact your doctor or nurse any time you get a long-lasting cough.    Take your child to the emergency department if they:    ?Become confused or stop responding to you    ?Have trouble breathing or have to work hard to breathe    Contact your child's doctor or nurse if the child:    ?Loses their sense of taste or smell or won't eat foods that they ate before    ?Has a very bad sore throat    ?Refuses to drink anything for a long time    ?Is younger than 4 months    ?Has a fever and is not acting like themselves    ?Has a cough that lasts for more than 2 weeks and is not getting any better or is getting worse    ?Has a stuffed or runny nose that gets worse or does not get any better after 10 days    ?Has red eyes or yellow goop coming out of their eyes    ?Has ear pain, pulls at their ears, or shows other signs of having an ear infection

## 2024-08-05 NOTE — H&P ADULT - ASSESSMENT
63F with a history of lung cancer (currently receiving experimental therapy) who presented for an outpatient PEG tube placement, being monitored for tube feed initiation and home care set up.

## 2024-08-05 NOTE — H&P ADULT - NSICDXFAMILYHX_GEN_ALL_CORE_FT
FAMILY HISTORY:  FH: diabetes mellitus    Father  Still living? Unknown  FH: HTN (hypertension), Age at diagnosis: Age Unknown

## 2024-08-05 NOTE — H&P ADULT - HISTORY OF PRESENT ILLNESS
63F with a history of lung cancer (currently receiving experimental therapy) who presented for an outpatient PEG tube placement. She is now admitted to medicine for further monitoring while tube feeds are initiated, and home care is arranged. She reports that she had been undergoing rapid weight loss (~20 lb over the past few months), and her oncologist had suggested she get a PEG tube. She reports pain with swallowing, though denies dysphagia. States she normally eats soft foods and is able to swallow water without difficulty. She was diagnosed with lung cancer in 2021 - she has progressed through 3 lines of therapy and is now on an experimental agent. She's unsure if it is metastatic. She reports placement of a PleurX in the setting of malignant ascites - states that she drains ~500ccs of fluid daily (reportedly there is more fluid remaining, though her provider's have advised her to not drain more than 500 ccs). She denies acute complaints at this time. 63F with a history of lung cancer (currently receiving experimental therapy) who presented for an outpatient PEG tube placement. She is now admitted to medicine for further monitoring while tube feeds are initiated, and home care is arranged. She reports that she had been undergoing rapid weight loss (~20 lb over the past few months), and her oncologist had suggested she get a PEG tube. She reports pain with swallowing, though denies dysphagia. States she normally eats soft foods and is able to swallow water without difficulty. She was diagnosed with lung cancer in 2021 - she has progressed through 3 lines of therapy and is now on an experimental agent. She's unsure if it is metastatic. She reports placement of a PleurX in the setting of malignant ascites - states that she drains ~500ccs of fluid daily (reportedly there is more fluid remaining, though her providers have advised her to not drain more than 500 ccs). She denies acute complaints at this time.

## 2024-08-05 NOTE — PRE PROCEDURE NOTE - PROCEDURE SERVICE
Endoscopy

## 2024-08-05 NOTE — H&P ADULT - PROBLEM SELECTOR PLAN 2
Reports odynophagia, unclear etiology - possibly in the setting of chemo-induced mucositis  - magic mouthwash x1 day   - Tylenol PRN for pain  - soft diet as above

## 2024-08-05 NOTE — H&P ADULT - PROBLEM SELECTOR PLAN 6
DVT ppx - Lovenox (high risk for DVT given malignancy)  Diet - soft with thin liquids; nutrition consult pending for PEG tube feeding recs  Dispo - home once services are arranged to help with tube feeds  Code status - DNR/DNI

## 2024-08-05 NOTE — PRE PROCEDURE NOTE - PRE PROCEDURE EVALUATION
Attending Physician:                            Procedure: EGD with PEG placement    Indication for Procedure: Malnutrition  ________________________________________________________  PAST MEDICAL & SURGICAL HISTORY:  Lung cancer      Constipation      Migraine      Infected venous access port      Dysphagia      Severe malnutrition      Diverticulosis      Shoulder mass      Asthma      History of appendectomy      Lipoma      H/O insertion of central venous access port      History of thoracentesis      H/O shoulder surgery        ALLERGIES:  Eggplant (Rash)  No Known Drug Allergies  cefazolin (Nausea)    HOME MEDICATIONS:  Ambien 5 mg oral tablet: 1 tab(s) orally once a day (at bedtime) as needed for  insomnia  metoclopramide 10 mg oral tablet: 1 tab(s) orally every 6 hours as needed for  nausea  MiraLax oral powder for reconstitution: 17 gram(s) orally once a day as needed for  constipation  omeprazole 40 mg oral delayed release capsule: 1 cap(s) orally once a day  ondansetron 8 mg oral tablet: 1 tab(s) orally every 8 hours as needed for  nausea  senna: 2 tablet with sensor once a day as needed for  constipation  Tums 500 mg oral tablet, chewable: 1 tab(s) chewed once a day  Tylenol 500 mg oral tablet: 2 tab(s) orally every 6 hours    AICD/PPM: [ ] yes   [ ] no    PERTINENT LAB DATA:                      PHYSICAL EXAMINATION:    Height (cm): 154.9  Weight (kg): 37.2  BMI (kg/m2): 15.5  BSA (m2): 1.29T(C): 36.3  HR: 84  BP: 95/54  RR: 15  SpO2: 94%    Constitutional: NAD    Neck:  No JVD  Respiratory: CTAB/L  Cardiovascular: S1 and S2  Gastrointestinal: BS+, soft, NT/ND  Extremities: No peripheral edema  Neurological: A/O x 3, no focal deficits        COMMENTS:    The patient is a suitable candidate for the planned procedure unless box checked [ ]  No, explain:

## 2024-08-05 NOTE — H&P ADULT - PROBLEM SELECTOR PLAN 4
Reports she drains ~500cc of fluid daily (was advised to not drain more than this, possibly because causes her to become hypotensive?)  - ensure nursing is aware to drain PleurX daily

## 2024-08-05 NOTE — CONSULT NOTE ADULT - ATTENDING COMMENTS
As above.    Patient seen and examined in the early afternoon.  Discussed with GI fellow earlier in the day.    Impression:    #1.  Metastatic lung cancer on palliative treatment, with malnutrition, episodes of mucositis, and malignant ascites status post indwelling percutaneous drain which allows for self removal of ascites  #2.  Had outpatient upper endoscopy with PEG placement today 8/5/24, admitted postprocedure for observation, nutrition consultation, nursing teaching of PEG care.    Recommendations:    #1.  May have flushes and medications via G-tube 4 hours post endoscopic placement.  #2.  Clear liquid diet now, may advance to previous diet tomorrow 8/6/2024 if feeling well  #3.  Post PEG clinical observation, nutrition consultation, nursing teaching of PEG care.  #4.  Followup in my GI office on 8/13/24 to assess and loosen PEG bumper.  #5.  See [facility] note for details, including followup arrangements.

## 2024-08-05 NOTE — H&P ADULT - NSHPPHYSICALEXAM_GEN_ALL_CORE
LOS:     VITALS:   T(C): 36.3 (08-05-24 @ 13:13), Max: 36.3 (08-05-24 @ 12:29)  HR: 69 (08-05-24 @ 15:03) (69 - 84)  BP: 82/50 (08-05-24 @ 15:03) (76/44 - 105/56)  RR: 19 (08-05-24 @ 15:03) (15 - 20)  SpO2: 100% (08-05-24 @ 15:03) (94% - 100%)    GENERAL: NAD, lying in bed comfortably, frail and cachectic   HEAD:  Atraumatic, Normocephalic  EYES: EOMI, PERRLA, conjunctiva and sclera clear  ENT: Moist mucous membranes, +mucositis  NECK: Supple, No JVD  CHEST/LUNG: Clear to auscultation bilaterally; No rales, rhonchi, wheezing, or rubs. Unlabored respirations  HEART: Regular rate and rhythm; No murmurs, rubs, or gallops  ABDOMEN: BSx4; Soft, mildly tender, moderately distended with ascites; both PEG tube and PLeurX sites are C/D/I  EXTREMITIES:  2+ Peripheral Pulses, brisk capillary refill. No clubbing, or cyanosis, +peripheral edema  NERVOUS SYSTEM:  A&Ox3, no focal deficits   SKIN: No rashes or lesions appreciated

## 2024-08-05 NOTE — ASU PATIENT PROFILE, ADULT - NSICDXPASTMEDICALHX_GEN_ALL_CORE_FT
PAST MEDICAL HISTORY:  Asthma     Constipation     Diverticulosis     Dysphagia     Infected venous access port     Lung cancer     Migraine     Severe malnutrition     Shoulder mass

## 2024-08-05 NOTE — ASU PATIENT PROFILE, ADULT - NSICDXPASTSURGICALHX_GEN_ALL_CORE_FT
PAST SURGICAL HISTORY:  H/O insertion of central venous access port     H/O shoulder surgery     History of appendectomy     History of thoracentesis     Lipoma

## 2024-08-05 NOTE — PRE-ANESTHESIA EVALUATION ADULT - NSANTHOSAYNRD_GEN_A_CORE
No. PRABHU screening performed.  STOP BANG Legend: 0-2 = LOW Risk; 3-4 = INTERMEDIATE Risk; 5-8 = HIGH Risk

## 2024-08-05 NOTE — H&P ADULT - NSHPREVIEWOFSYSTEMS_GEN_ALL_CORE
REVIEW OF SYSTEMS:    CONSTITUTIONAL: +weakness, no fevers or chills  EYES: No visual changes; no sclera icterics, no pain or drainage  ENT:  No vertigo or throat pain   NECK: No pain or stiffness  RESPIRATORY: No cough, wheezing, hemoptysis; No shortness of breath  CARDIOVASCULAR: No chest pain or palpitations  GASTROINTESTINAL: No abdominal or epigastric pain. No nausea, vomiting, or hematemesis; No diarrhea or constipation. No melena or hematochezia.  GENITOURINARY: No dysuria, frequency or hematuria  NEUROLOGICAL: No numbness or weakness  SKIN: No itching, rashes  Psych: No anxiety or depression

## 2024-08-05 NOTE — H&P ADULT - CONVERSATION DETAILS
Discussed with patient what her wishes would be were she to undergo cardiac arrest. She stated she would prefer to pass naturally, stating if she were to undergo CPR and placed on a ventilator that it would not be a quality of life she would be ok with, believing there would be "no point" living in such a state. She additionally stated she would not want to undergo the trauma of intubation were she to develop respiratory failure. She would be ok receiving dialysis in the event of kidney failure if there was a meaningful chance of recovery.

## 2024-08-06 LAB
ALBUMIN SERPL ELPH-MCNC: 2.2 G/DL — LOW (ref 3.3–5)
ALP SERPL-CCNC: 50 U/L — SIGNIFICANT CHANGE UP (ref 40–120)
ALT FLD-CCNC: 8 U/L — LOW (ref 10–45)
ANION GAP SERPL CALC-SCNC: 7 MMOL/L — SIGNIFICANT CHANGE UP (ref 5–17)
AST SERPL-CCNC: 14 U/L — SIGNIFICANT CHANGE UP (ref 10–40)
BASOPHILS # BLD AUTO: 0.05 K/UL — SIGNIFICANT CHANGE UP (ref 0–0.2)
BASOPHILS NFR BLD AUTO: 1.1 % — SIGNIFICANT CHANGE UP (ref 0–2)
BILIRUB SERPL-MCNC: 0.3 MG/DL — SIGNIFICANT CHANGE UP (ref 0.2–1.2)
BUN SERPL-MCNC: 7 MG/DL — SIGNIFICANT CHANGE UP (ref 7–23)
CALCIUM SERPL-MCNC: 8.1 MG/DL — LOW (ref 8.4–10.5)
CHLORIDE SERPL-SCNC: 109 MMOL/L — HIGH (ref 96–108)
CO2 SERPL-SCNC: 22 MMOL/L — SIGNIFICANT CHANGE UP (ref 22–31)
CREAT SERPL-MCNC: 0.73 MG/DL — SIGNIFICANT CHANGE UP (ref 0.5–1.3)
EGFR: 93 ML/MIN/1.73M2 — SIGNIFICANT CHANGE UP
EOSINOPHIL # BLD AUTO: 0.06 K/UL — SIGNIFICANT CHANGE UP (ref 0–0.5)
EOSINOPHIL NFR BLD AUTO: 1.3 % — SIGNIFICANT CHANGE UP (ref 0–6)
GLUCOSE SERPL-MCNC: 74 MG/DL — SIGNIFICANT CHANGE UP (ref 70–99)
HCT VFR BLD CALC: 23.4 % — LOW (ref 34.5–45)
HCT VFR BLD CALC: 25.1 % — LOW (ref 34.5–45)
HGB BLD-MCNC: 7.1 G/DL — LOW (ref 11.5–15.5)
HGB BLD-MCNC: 8 G/DL — LOW (ref 11.5–15.5)
IMM GRANULOCYTES NFR BLD AUTO: 0.4 % — SIGNIFICANT CHANGE UP (ref 0–0.9)
LYMPHOCYTES # BLD AUTO: 1.14 K/UL — SIGNIFICANT CHANGE UP (ref 1–3.3)
LYMPHOCYTES # BLD AUTO: 25.5 % — SIGNIFICANT CHANGE UP (ref 13–44)
MAGNESIUM SERPL-MCNC: 1.7 MG/DL — SIGNIFICANT CHANGE UP (ref 1.6–2.6)
MCHC RBC-ENTMCNC: 28.3 PG — SIGNIFICANT CHANGE UP (ref 27–34)
MCHC RBC-ENTMCNC: 29.1 PG — SIGNIFICANT CHANGE UP (ref 27–34)
MCHC RBC-ENTMCNC: 30.3 GM/DL — LOW (ref 32–36)
MCHC RBC-ENTMCNC: 31.9 GM/DL — LOW (ref 32–36)
MCV RBC AUTO: 91.3 FL — SIGNIFICANT CHANGE UP (ref 80–100)
MCV RBC AUTO: 93.2 FL — SIGNIFICANT CHANGE UP (ref 80–100)
MONOCYTES # BLD AUTO: 0.11 K/UL — SIGNIFICANT CHANGE UP (ref 0–0.9)
MONOCYTES NFR BLD AUTO: 2.5 % — SIGNIFICANT CHANGE UP (ref 2–14)
NEUTROPHILS # BLD AUTO: 3.09 K/UL — SIGNIFICANT CHANGE UP (ref 1.8–7.4)
NEUTROPHILS NFR BLD AUTO: 69.2 % — SIGNIFICANT CHANGE UP (ref 43–77)
NRBC # BLD: 0 /100 WBCS — SIGNIFICANT CHANGE UP (ref 0–0)
NRBC # BLD: 0 /100 WBCS — SIGNIFICANT CHANGE UP (ref 0–0)
PHOSPHATE SERPL-MCNC: 3.8 MG/DL — SIGNIFICANT CHANGE UP (ref 2.5–4.5)
PLATELET # BLD AUTO: 164 K/UL — SIGNIFICANT CHANGE UP (ref 150–400)
PLATELET # BLD AUTO: 178 K/UL — SIGNIFICANT CHANGE UP (ref 150–400)
POTASSIUM SERPL-MCNC: 3.7 MMOL/L — SIGNIFICANT CHANGE UP (ref 3.5–5.3)
POTASSIUM SERPL-SCNC: 3.7 MMOL/L — SIGNIFICANT CHANGE UP (ref 3.5–5.3)
PROT SERPL-MCNC: 3.7 G/DL — LOW (ref 6–8.3)
RBC # BLD: 2.51 M/UL — LOW (ref 3.8–5.2)
RBC # BLD: 2.75 M/UL — LOW (ref 3.8–5.2)
RBC # FLD: 23 % — HIGH (ref 10.3–14.5)
RBC # FLD: 23.1 % — HIGH (ref 10.3–14.5)
SODIUM SERPL-SCNC: 138 MMOL/L — SIGNIFICANT CHANGE UP (ref 135–145)
WBC # BLD: 4.47 K/UL — SIGNIFICANT CHANGE UP (ref 3.8–10.5)
WBC # BLD: 5.11 K/UL — SIGNIFICANT CHANGE UP (ref 3.8–10.5)
WBC # FLD AUTO: 4.47 K/UL — SIGNIFICANT CHANGE UP (ref 3.8–10.5)
WBC # FLD AUTO: 5.11 K/UL — SIGNIFICANT CHANGE UP (ref 3.8–10.5)

## 2024-08-06 PROCEDURE — 99233 SBSQ HOSP IP/OBS HIGH 50: CPT

## 2024-08-06 RX ADMIN — PANTOPRAZOLE SODIUM 40 MILLIGRAM(S): 20 TABLET, DELAYED RELEASE ORAL at 18:34

## 2024-08-06 RX ADMIN — ENOXAPARIN SODIUM 40 MILLIGRAM(S): 120 INJECTION SUBCUTANEOUS at 05:16

## 2024-08-06 NOTE — DIETITIAN INITIAL EVALUATION ADULT - PERTINENT LABORATORY DATA
08-06    138  |  109<H>  |  7   ----------------------------<  74  3.7   |  22  |  0.73    Ca    8.1<L>      06 Aug 2024 06:58  Phos  3.8     08-06  Mg     1.7     08-06    TPro  3.7<L>  /  Alb  2.2<L>  /  TBili  0.3  /  DBili  x   /  AST  14  /  ALT  8<L>  /  AlkPhos  50  08-06

## 2024-08-06 NOTE — PROGRESS NOTE ADULT - SUBJECTIVE AND OBJECTIVE BOX
Gastroenterology/Hepatology Progress Note      Interval Events:     - Patient underwent EGD/PEG placement on 8/5.   - Has been tolerating PO diet well.   - Reported mild abd discomfort but no nausea or vomiting. Only on clear diet at this time.   - No fevers or chills.   - Hgb decreased from 9.5 to 7.1, but denied over signs of GI bleeding.     Allergies:  Eggplant (Rash)  No Known Drug Allergies  cefazolin (Nausea)      Hospital Medications:  acetaminophen     Tablet .. 650 milliGRAM(s) Oral every 6 hours PRN  aluminum hydroxide/magnesium hydroxide/simethicone Suspension 30 milliLiter(s) Oral every 4 hours PRN  clindamycin IVPB 600 milliGRAM(s) IV Intermittent once  enoxaparin Injectable 40 milliGRAM(s) SubCutaneous every 24 hours  melatonin 3 milliGRAM(s) Oral at bedtime PRN  ondansetron Injectable 4 milliGRAM(s) IV Push every 8 hours PRN  pantoprazole   Suspension 40 milliGRAM(s) Oral daily  zolpidem 5 milliGRAM(s) Oral at bedtime PRN      ROS: 14 point ROS negative unless otherwise state in subjective    PHYSICAL EXAM:   Vital Signs:  Vital Signs Last 24 Hrs  T(C): 36.5 (06 Aug 2024 08:35), Max: 36.5 (06 Aug 2024 08:35)  T(F): 97.7 (06 Aug 2024 08:35), Max: 97.7 (06 Aug 2024 08:35)  HR: 77 (06 Aug 2024 08:35) (69 - 84)  BP: 89/58 (06 Aug 2024 08:35) (76/44 - 105/56)  BP(mean): --  RR: 18 (06 Aug 2024 08:35) (15 - 20)  SpO2: 100% (06 Aug 2024 08:35) (94% - 100%)    Parameters below as of 06 Aug 2024 08:35  Patient On (Oxygen Delivery Method): room air      Daily Height in cm: 154.94 (05 Aug 2024 13:13)    Daily     PHYSICAL EXAM:     GENERAL:  No acute distress, cachectic, chronically ill appearing, lying in bed.   HEENT:  NCAT, no scleral icterus   CHEST:  no respiratory distress  HEART:  Regular rate and rhythm  ABDOMEN:  Soft, has PEG tube in place, external bumper at 1.5 cm, with no bleeding at the site, mild tenderness at the site, no drainage around the site, non distended, also has peritoneal drain placed in the RUQ.   EXTREMITIES: No LE edema  SKIN:  No rash/erythema/ecchymoses/petechiae/wounds/abscess/warm/dry  NEURO:  Alert and oriented x 3, no tremors.     LABS:                        7.1    4.47  )-----------( 164      ( 06 Aug 2024 07:00 )             23.4     Mean Cell Volume: 93.2 fl (08-06-24 @ 07:00)    08-06    138  |  109<H>  |  7   ----------------------------<  74  3.7   |  22  |  0.73    Ca    8.1<L>      06 Aug 2024 06:58  Phos  3.8     08-06  Mg     1.7     08-06    TPro  3.7<L>  /  Alb  2.2<L>  /  TBili  0.3  /  DBili  x   /  AST  14  /  ALT  8<L>  /  AlkPhos  50  08-06    LIVER FUNCTIONS - ( 06 Aug 2024 06:58 )  Alb: 2.2 g/dL / Pro: 3.7 g/dL / ALK PHOS: 50 U/L / ALT: 8 U/L / AST: 14 U/L / GGT: x             Urinalysis Basic - ( 06 Aug 2024 06:58 )    Color: x / Appearance: x / SG: x / pH: x  Gluc: 74 mg/dL / Ketone: x  / Bili: x / Urobili: x   Blood: x / Protein: x / Nitrite: x   Leuk Esterase: x / RBC: x / WBC x   Sq Epi: x / Non Sq Epi: x / Bacteria: x            Imaging:    Impression:          - Gastroesophageal flap valve classified as Hill Grade IV (no fold, wide open                        lumen, hiatal hernia present).                       - 3 cm hiatal hernia.                       - Normal stomach.                       - Normal first portion of the duodenum.                       - Duodenal mucosal atrophy.                       - An externally removable 20 Fr PEG placement was successfully completed.                       - No specimens collected.

## 2024-08-06 NOTE — DIETITIAN INITIAL EVALUATION ADULT - PROBLEM SELECTOR PLAN 1
Per patient and , her oncologist Dr. Stanford ([facility] [place]) recommended PEG placement in the setting of malnutrition and rapid weight loss; the patient does report that she does not have difficulty swallowing and normally is able to consume a soft diet and water  - per the patient, will continue PO diet for now  - supplemental tube feedings to commence following nutrition recs, nutrition consult to be placed [As Noted in HPI] : as noted in HPI [Negative] : Heme/Lymph

## 2024-08-06 NOTE — DIETITIAN INITIAL EVALUATION ADULT - ORAL INTAKE PTA/DIET HISTORY
Pt with poor PO intake PTA, especially on Chemo therapy weeks. Allergy to eggplant confirmed. Reports eating softer foods when she had an appetite. Endorses constipation.

## 2024-08-06 NOTE — DIETITIAN INITIAL EVALUATION ADULT - PROBLEM SELECTOR PLAN 3
Reports progressing through 3 lines of therapy, now on an experiemental agent  - ensure continued follow up with Dr. Stanford ([facility] [place])

## 2024-08-06 NOTE — PROGRESS NOTE ADULT - SUBJECTIVE AND OBJECTIVE BOX
SUBJECTIVE / OVERNIGHT EVENTS:  Today is hospital day 1d. There are no new issues or overnight events.   Did not endorse any headache, lightheadedness, vertigo, shortness of breathe, cough, chest pain, palpitations, tachycardia, abdominal pain, currently    HPI:  63F with a history of lung cancer (currently receiving experimental therapy) who presented for an outpatient PEG tube placement. She is now admitted to medicine for further monitoring while tube feeds are initiated, and home care is arranged. She reports that she had been undergoing rapid weight loss (~20 lb over the past few months), and her oncologist had suggested she get a PEG tube. She reports pain with swallowing, though denies dysphagia. States she normally eats soft foods and is able to swallow water without difficulty. She was diagnosed with lung cancer in 2021 - she has progressed through 3 lines of therapy and is now on an experimental agent. She's unsure if it is metastatic. She reports placement of a PleurX in the setting of malignant ascites - states that she drains ~500ccs of fluid daily (reportedly there is more fluid remaining, though her providers have advised her to not drain more than 500 ccs). She denies acute complaints at this time. (05 Aug 2024 15:07)    MEDICATIONS  (STANDING):  clindamycin IVPB 600 milliGRAM(s) IV Intermittent once  enoxaparin Injectable 40 milliGRAM(s) SubCutaneous every 24 hours  pantoprazole   Suspension 40 milliGRAM(s) Oral daily    MEDICATIONS  (PRN):  acetaminophen     Tablet .. 650 milliGRAM(s) Oral every 6 hours PRN Temp greater or equal to 38C (100.4F), Mild Pain (1 - 3)  aluminum hydroxide/magnesium hydroxide/simethicone Suspension 30 milliLiter(s) Oral every 4 hours PRN Dyspepsia  melatonin 3 milliGRAM(s) Oral at bedtime PRN Insomnia  ondansetron Injectable 4 milliGRAM(s) IV Push every 8 hours PRN Nausea and/or Vomiting  zolpidem 5 milliGRAM(s) Oral at bedtime PRN Insomnia    HOME MEDICATIONS:  Ambien 5 mg oral tablet: 1 tab(s) orally once a day (at bedtime) as needed for  insomnia  metoclopramide 10 mg oral tablet: 1 tab(s) orally every 6 hours as needed for  nausea  MiraLax oral powder for reconstitution: 17 gram(s) orally once a day as needed for  constipation  omeprazole 40 mg oral delayed release capsule: 1 cap(s) orally once a day  ondansetron 8 mg oral tablet: 1 tab(s) orally every 8 hours as needed for  nausea  senna: 2 tablet with sensor once a day as needed for  constipation  Tums 500 mg oral tablet, chewable: 1 tab(s) chewed once a day  Tylenol 500 mg oral tablet: 2 tab(s) orally every 6 hours    PHYSICAL EXAM  Vital Signs Last 24 Hrs  T(C): 36.5 (06 Aug 2024 08:35), Max: 36.5 (06 Aug 2024 08:35)  T(F): 97.7 (06 Aug 2024 08:35), Max: 97.7 (06 Aug 2024 08:35)  HR: 77 (06 Aug 2024 08:35) (72 - 77)  BP: 89/58 (06 Aug 2024 08:35) (82/50 - 90/55)  BP(mean): --  RR: 18 (06 Aug 2024 08:35) (17 - 20)  SpO2: 100% (06 Aug 2024 08:35) (100% - 100%)    Parameters below as of 06 Aug 2024 08:35  Patient On (Oxygen Delivery Method): room air        08-06-24 @ 07:01  -  08-06-24 @ 15:22  --------------------------------------------------------  IN: 500 mL / OUT: 0 mL / NET: 500 mL      CONSTITUTIONAL: Well-groomed, in no apparent distress;  EYES: No conjunctival or scleral injection, non-icteric;  ENMT: No external nasal lesions; Normal outer ears;  NECK: Trachea midline;  RESPIRATORY: Normal respiratory effort;   CARDIOVASCULAR: Regular rate and rhythm;  GASTROINTESTINAL: Non-distended;   EXTREMITIES:  No lower extremity edema;  NEUROLOGY: A+O to person, place, and time; Does respond to commands appropriately;  PSYCHIATRY: Mood and Affect appropriate    LABS:                        7.1    4.47  )-----------( 164      ( 06 Aug 2024 07:00 )             23.4     08-06    138  |  109<H>  |  7   ----------------------------<  74  3.7   |  22  |  0.73    Ca    8.1<L>      06 Aug 2024 06:58  Phos  3.8     08-06  Mg     1.7     08-06    TPro  3.7<L>  /  Alb  2.2<L>  /  TBili  0.3  /  DBili  x   /  AST  14  /  ALT  8<L>  /  AlkPhos  50  08-06          Urinalysis Basic - ( 06 Aug 2024 06:58 )    Color: x / Appearance: x / SG: x / pH: x  Gluc: 74 mg/dL / Ketone: x  / Bili: x / Urobili: x   Blood: x / Protein: x / Nitrite: x   Leuk Esterase: x / RBC: x / WBC x   Sq Epi: x / Non Sq Epi: x / Bacteria: x            RADIOLOGY & ADDITIONAL TESTS:  EKG

## 2024-08-06 NOTE — DIETITIAN INITIAL EVALUATION ADULT - PROBLEM SELECTOR PLAN 5
Noted that the patient's blood pressure is normally 80's/50's - confirmed with patient and , and prior outpatient GI visit in [facility]  - monitor

## 2024-08-06 NOTE — DIETITIAN INITIAL EVALUATION ADULT - ADD RECOMMEND
1. Patient at risk for refeeding syndrome, as tolerated, recommend Jevity 1.5 @ 10 mL/hr and advance by 10mL q12H until goal rate of 40mL/hr x24hrs is reached. Regimen at goal provides 960 mL total volume, 730 mL free water, 1440 kcal/d and 61g pro/day (30.25 kcal/kg and 1.28 g/kg) based on IBW weight 47.6 kg  2. Trend electrolytes closely and replete as indicated   3. Add Multivitamin and Thiamine daily in setting of refeeding risk   4.Defer diet/texture advancement to medical team/SLP as indicated   5.RD remains available to adjust EN formulary, volume/rate  6. Monitor GI tolerance, skin integrity, labs, weight, and bowel movement regularity.  RD remains available upon request and will follow-up per protocol.   7. Malnutrition Sticker placed in pt. chart

## 2024-08-06 NOTE — DIETITIAN INITIAL EVALUATION ADULT - REASON INDICATOR FOR ASSESSMENT
Consult- Nutrition Services Tube Feeding  Information obtained from: Review of pt's current medical record, interview with pt in her assigned room on [facility]

## 2024-08-06 NOTE — DIETITIAN INITIAL EVALUATION ADULT - PERTINENT MEDS FT
MEDICATIONS  (STANDING):  clindamycin IVPB 600 milliGRAM(s) IV Intermittent once  enoxaparin Injectable 40 milliGRAM(s) SubCutaneous every 24 hours  pantoprazole   Suspension 40 milliGRAM(s) Oral daily    MEDICATIONS  (PRN):  acetaminophen     Tablet .. 650 milliGRAM(s) Oral every 6 hours PRN Temp greater or equal to 38C (100.4F), Mild Pain (1 - 3)  aluminum hydroxide/magnesium hydroxide/simethicone Suspension 30 milliLiter(s) Oral every 4 hours PRN Dyspepsia  melatonin 3 milliGRAM(s) Oral at bedtime PRN Insomnia  ondansetron Injectable 4 milliGRAM(s) IV Push every 8 hours PRN Nausea and/or Vomiting  zolpidem 5 milliGRAM(s) Oral at bedtime PRN Insomnia

## 2024-08-06 NOTE — DIETITIAN INITIAL EVALUATION ADULT - REASON FOR ADMISSION
Chart Reviewed, Events Noted  "63F with a history of lung cancer (currently receiving experimental therapy) who presented for an outpatient PEG tube placement, being monitored for tube feed initiation and home care set up."

## 2024-08-07 LAB
ANION GAP SERPL CALC-SCNC: 10 MMOL/L — SIGNIFICANT CHANGE UP (ref 5–17)
BUN SERPL-MCNC: 7 MG/DL — SIGNIFICANT CHANGE UP (ref 7–23)
CALCIUM SERPL-MCNC: 8.5 MG/DL — SIGNIFICANT CHANGE UP (ref 8.4–10.5)
CHLORIDE SERPL-SCNC: 105 MMOL/L — SIGNIFICANT CHANGE UP (ref 96–108)
CO2 SERPL-SCNC: 23 MMOL/L — SIGNIFICANT CHANGE UP (ref 22–31)
CREAT SERPL-MCNC: 0.73 MG/DL — SIGNIFICANT CHANGE UP (ref 0.5–1.3)
EGFR: 93 ML/MIN/1.73M2 — SIGNIFICANT CHANGE UP
GLUCOSE SERPL-MCNC: 97 MG/DL — SIGNIFICANT CHANGE UP (ref 70–99)
HCT VFR BLD CALC: 26.8 % — LOW (ref 34.5–45)
HGB BLD-MCNC: 8.4 G/DL — LOW (ref 11.5–15.5)
MCHC RBC-ENTMCNC: 28.9 PG — SIGNIFICANT CHANGE UP (ref 27–34)
MCHC RBC-ENTMCNC: 31.3 GM/DL — LOW (ref 32–36)
MCV RBC AUTO: 92.1 FL — SIGNIFICANT CHANGE UP (ref 80–100)
NRBC # BLD: 0 /100 WBCS — SIGNIFICANT CHANGE UP (ref 0–0)
PLATELET # BLD AUTO: 210 K/UL — SIGNIFICANT CHANGE UP (ref 150–400)
POTASSIUM SERPL-MCNC: 3.8 MMOL/L — SIGNIFICANT CHANGE UP (ref 3.5–5.3)
POTASSIUM SERPL-SCNC: 3.8 MMOL/L — SIGNIFICANT CHANGE UP (ref 3.5–5.3)
RBC # BLD: 2.91 M/UL — LOW (ref 3.8–5.2)
RBC # FLD: 23 % — HIGH (ref 10.3–14.5)
SODIUM SERPL-SCNC: 138 MMOL/L — SIGNIFICANT CHANGE UP (ref 135–145)
WBC # BLD: 4.87 K/UL — SIGNIFICANT CHANGE UP (ref 3.8–10.5)
WBC # FLD AUTO: 4.87 K/UL — SIGNIFICANT CHANGE UP (ref 3.8–10.5)

## 2024-08-07 PROCEDURE — 99233 SBSQ HOSP IP/OBS HIGH 50: CPT | Mod: GC

## 2024-08-07 PROCEDURE — 99232 SBSQ HOSP IP/OBS MODERATE 35: CPT

## 2024-08-07 RX ADMIN — PANTOPRAZOLE SODIUM 40 MILLIGRAM(S): 20 TABLET, DELAYED RELEASE ORAL at 11:18

## 2024-08-07 RX ADMIN — ENOXAPARIN SODIUM 40 MILLIGRAM(S): 120 INJECTION SUBCUTANEOUS at 05:12

## 2024-08-07 RX ADMIN — Medication 650 MILLIGRAM(S): at 18:59

## 2024-08-07 RX ADMIN — Medication 650 MILLIGRAM(S): at 19:55

## 2024-08-07 RX ADMIN — Medication 30 MILLILITER(S): at 03:37

## 2024-08-07 NOTE — CONSULT NOTE ADULT - SUBJECTIVE AND OBJECTIVE BOX
Reason for consult: met lung ca    HPI:  63F with a history of lung cancer (currently receiving experimental therapy) who presented for an outpatient PEG tube placement. She is now admitted to medicine for further monitoring while tube feeds are initiated, and home care is arranged. She reports that she had been undergoing rapid weight loss (~20 lb over the past few months), and her oncologist had suggested she get a PEG tube. She reports pain with swallowing, though denies dysphagia. States she normally eats soft foods and is able to swallow water without difficulty. She was diagnosed with lung cancer in 2021 - she has progressed through 3 lines of therapy and is now on an experimental agent. She's unsure if it is metastatic. She reports placement of a PleurX in the setting of malignant ascites - states that she drains ~500ccs of fluid daily (reportedly there is more fluid remaining, though her providers have advised her to not drain more than 500 ccs). She denies acute complaints at this time. (05 Aug 2024 15:07)    Heme/onc consulted on this 62 y/o female with stage 4 metastatic NSCLC, adenocarcinoma, on protocol , LD 7/31. Follows with Dr. Stanford at [facility] [place]. Previously had POD on osimertinib, pemetrexed and arjun maintenance after chemotherapy with pemetrexed, carboplatin and bevacizumab. Also POD on weekly [place], docetaxel w/ afatinib.    PAST MEDICAL & SURGICAL HISTORY:  Lung cancer      Constipation      Migraine      Infected venous access port      Dysphagia      Severe malnutrition      Diverticulosis      Shoulder mass      Asthma      History of appendectomy      Lipoma      H/O insertion of central venous access port      History of thoracentesis      H/O shoulder surgery          FAMILY HISTORY:  FH: HTN (hypertension) (Father)    FH: diabetes mellitus        Alochol: Denied  Smoking: Nonsmoker  Drug Use: Denied  Marital Status:         Allergies    Eggplant (Rash)  No Known Drug Allergies    Intolerances    cefazolin (Nausea)      MEDICATIONS  (STANDING):  clindamycin IVPB 600 milliGRAM(s) IV Intermittent once  enoxaparin Injectable 40 milliGRAM(s) SubCutaneous every 24 hours  pantoprazole   Suspension 40 milliGRAM(s) Oral daily    MEDICATIONS  (PRN):  acetaminophen     Tablet .. 650 milliGRAM(s) Oral every 6 hours PRN Temp greater or equal to 38C (100.4F), Mild Pain (1 - 3)  aluminum hydroxide/magnesium hydroxide/simethicone Suspension 30 milliLiter(s) Oral every 4 hours PRN Dyspepsia  melatonin 3 milliGRAM(s) Oral at bedtime PRN Insomnia  ondansetron Injectable 4 milliGRAM(s) IV Push every 8 hours PRN Nausea and/or Vomiting  zolpidem 5 milliGRAM(s) Oral at bedtime PRN Insomnia      ROS  No fever, sweats, chills  No epistaxis, HA, sore throat  No CP, SOB, cough, sputum  +nausea  No edema  No rash  No anxiety  No back pain, joint pain  No bleeding, bruising  No dysuria, hematuria    T(C): 36.5 (08-07-24 @ 08:44), Max: 36.6 (08-07-24 @ 00:00)  HR: 107 (08-07-24 @ 08:44) (80 - 107)  BP: 91/63 (08-07-24 @ 08:44) (86/55 - 93/60)  RR: 18 (08-07-24 @ 08:44) (18 - 18)  SpO2: 100% (08-07-24 @ 08:44) (100% - 100%)  Wt(kg): --    PE  NAD  Awake, alert  Anicteric, MMM  RRR  CTAB  Abd soft, NT, ND  Tube feeds  No c/c/e  No rash grossly  FROM                          8.4    4.87  )-----------( 210      ( 07 Aug 2024 06:59 )             26.8       08-07    138  |  105  |  7   ----------------------------<  97  3.8   |  23  |  0.73    Ca    8.5      07 Aug 2024 06:59  Phos  3.8     08-06  Mg     1.7     08-06    TPro  3.7<L>  /  Alb  2.2<L>  /  TBili  0.3  /  DBili  x   /  AST  14  /  ALT  8<L>  /  AlkPhos  50  08-06  
  HPI:    Ms. Mejia is a 63 yrs old female w/ hx lung cancer c/w malignant ascites s/p peritoneal drain placement (currently on experimental therapy) who presented for o/p PEG tube placement. Patient had significant weight loss for the past 3 months, over 20 lbs and her oncologist had suggested that she get PEG tube placement for malnutrition. Patient has odynophagia but denied dysphagia, nausea, vomiting, and abd pain. She has been draining her Pluerx catheter daily about 500 cc per day, this morning, prior to the procedure, she drained it as well.  s/p EGD with PEG placement today, no complications from the procedure. [facility] will follow for PEG tube placement.     Allergies:  Eggplant (Rash)  No Known Drug Allergies  cefazolin (Nausea)      Home Medications:  · 	Tylenol 500 mg oral tablet: Last Dose Taken: July, 2024 , 2 tab(s) orally every 6 hours  · 	metoclopramide 10 mg oral tablet: Last Dose Taken: UNKNOWN UNKNOWN, 1 tab(s) orally every 6 hours as needed for  nausea  · 	omeprazole 40 mg oral delayed release capsule: Last Dose Taken: 05-Aug-2024 AM, 1 cap(s) orally once a day  · 	Tums 500 mg oral tablet, chewable: Last Dose Taken: July, 2024 , 1 tab(s) chewed once a day  · 	ondansetron 8 mg oral tablet: Last Dose Taken: UNKNOWN UNKNOWN, 1 tab(s) orally every 8 hours as needed for  nausea  · 	Ambien 5 mg oral tablet: Last Dose Taken: 03-Aug-2024 , 1 tab(s) orally once a day (at bedtime) as needed for  insomnia  · 	senna: Last Dose Taken: UNKNOWN UNKNOWN, 2 tablet with sensor once a day as needed for  constipation  · 	MiraLax oral powder for reconstitution: Last Dose Taken: UNKNOWN UNKNOWN, 17 gram(s) orally once a day as needed for  constipation      Hospital Medications:  clindamycin IVPB 600 milliGRAM(s) IV Intermittent once  pantoprazole    Tablet 40 milliGRAM(s) Oral before breakfast  polyethylene glycol 3350 17 Gram(s) Oral daily  senna 2 Tablet(s) Oral at bedtime  zolpidem 5 milliGRAM(s) Oral at bedtime PRN      PMHX/PSHX:  Asthma    Lung cancer    Constipation    Migraine    Infected venous access port    Dysphagia    Severe malnutrition    History of thoracentesis    Diverticulosis    Shoulder mass    Asthma    History of appendectomy    Lipoma    H/O insertion of central venous access port    History of thoracentesis    H/O shoulder surgery      Family history:  FH: HTN (hypertension) (Father)    FH: diabetes mellitus      Social History:   Tob: Denies  EtOH: Denies  Illicit Drugs: Denies    ROS:     General:  No wt loss, fevers, chills, night sweats, fatigue  Eyes:  Good vision, no reported pain  ENT:  No sore throat, pain, runny nose, dysphagia  CV:  No pain, palpitations, hypo/hypertension  Pulm:  No dyspnea, cough, tachypnea, wheezing  GI:  see HPI  :  No pain, bleeding, incontinence, nocturia  Muscle:  No pain, weakness  Neuro:  No weakness, tingling, memory problems  Psych:  No fatigue, insomnia, mood problems, depression  Endocrine:  No polyuria, polydipsia, cold/heat intolerance  Heme:  No petechiae, ecchymosis, easy bruisability  Skin:  No rash, tattoos, scars, edema    PHYSICAL EXAM:     GENERAL:  No acute distress, cachectic, chronically ill appearing, lying in bed.   HEENT:  NCAT, no scleral icterus   CHEST:  no respiratory distress  HEART:  Regular rate and rhythm  ABDOMEN:  Soft, non-tender, non-distended, has peritoneal drain in epigastric region, no masses  EXTREMITIES: No LE edema  SKIN:  No rash/erythema/ecchymoses/petechiae/wounds/abscess/warm/dry  NEURO:  Alert and oriented x 3, no tremors.     Vital Signs:  Vital Signs Last 24 Hrs  T(C): 36.3 (05 Aug 2024 13:13), Max: 36.3 (05 Aug 2024 12:29)  T(F): 97.3 (05 Aug 2024 12:29), Max: 97.3 (05 Aug 2024 12:29)  HR: 72 (05 Aug 2024 15:30) (69 - 84)  BP: 84/54 (05 Aug 2024 15:30) (76/44 - 105/56)  BP(mean): --  RR: 20 (05 Aug 2024 15:30) (15 - 20)  SpO2: 100% (05 Aug 2024 15:30) (94% - 100%)    Parameters below as of 05 Aug 2024 15:30  Patient On (Oxygen Delivery Method): room air      Daily Height in cm: 154.94 (05 Aug 2024 13:13)    Daily     LABS:                        Imaging:      EGD s/p PEG tube placement on 8/5  3 cm hiatal hernia  atrophic mucosa in the second portion of the duodenum.   externally removable 20 Fr tube was placed endoscopically.

## 2024-08-07 NOTE — PROGRESS NOTE ADULT - SUBJECTIVE AND OBJECTIVE BOX
SUBJECTIVE / OVERNIGHT EVENTS:  Today is hospital day 2d. There are no new issues or overnight events. + nausea with tube feeds  Did not endorse any headache, lightheadedness, vertigo, shortness of breathe, cough, chest pain, palpitations, tachycardia, abdominal pain, diarrhea or constipation currently    HPI:  63F with a history of lung cancer (currently receiving experimental therapy) who presented for an outpatient PEG tube placement. She is now admitted to medicine for further monitoring while tube feeds are initiated, and home care is arranged. She reports that she had been undergoing rapid weight loss (~20 lb over the past few months), and her oncologist had suggested she get a PEG tube. She reports pain with swallowing, though denies dysphagia. States she normally eats soft foods and is able to swallow water without difficulty. She was diagnosed with lung cancer in 2021 - she has progressed through 3 lines of therapy and is now on an experimental agent. She's unsure if it is metastatic. She reports placement of a PleurX in the setting of malignant ascites - states that she drains ~500ccs of fluid daily (reportedly there is more fluid remaining, though her providers have advised her to not drain more than 500 ccs). She denies acute complaints at this time. (05 Aug 2024 15:07)    MEDICATIONS  (STANDING):  clindamycin IVPB 600 milliGRAM(s) IV Intermittent once  enoxaparin Injectable 40 milliGRAM(s) SubCutaneous every 24 hours  pantoprazole   Suspension 40 milliGRAM(s) Oral daily    MEDICATIONS  (PRN):  acetaminophen     Tablet .. 650 milliGRAM(s) Oral every 6 hours PRN Temp greater or equal to 38C (100.4F), Mild Pain (1 - 3)  aluminum hydroxide/magnesium hydroxide/simethicone Suspension 30 milliLiter(s) Oral every 4 hours PRN Dyspepsia  melatonin 3 milliGRAM(s) Oral at bedtime PRN Insomnia  ondansetron Injectable 4 milliGRAM(s) IV Push every 8 hours PRN Nausea and/or Vomiting  zolpidem 5 milliGRAM(s) Oral at bedtime PRN Insomnia    HOME MEDICATIONS:  Ambien 5 mg oral tablet: 1 tab(s) orally once a day (at bedtime) as needed for  insomnia  metoclopramide 10 mg oral tablet: 1 tab(s) orally every 6 hours as needed for  nausea  MiraLax oral powder for reconstitution: 17 gram(s) orally once a day as needed for  constipation  omeprazole 40 mg oral delayed release capsule: 1 cap(s) orally once a day  ondansetron 8 mg oral tablet: 1 tab(s) orally every 8 hours as needed for  nausea  senna: 2 tablet with sensor once a day as needed for  constipation  Tums 500 mg oral tablet, chewable: 1 tab(s) chewed once a day  Tylenol 500 mg oral tablet: 2 tab(s) orally every 6 hours    PHYSICAL EXAM  Vital Signs Last 24 Hrs  T(C): 36.4 (07 Aug 2024 16:07), Max: 36.6 (07 Aug 2024 00:00)  T(F): 97.5 (07 Aug 2024 16:07), Max: 97.8 (07 Aug 2024 00:00)  HR: 100 (07 Aug 2024 16:07) (80 - 107)  BP: 82/58 (07 Aug 2024 16:07) (82/58 - 94/62)  BP(mean): --  RR: 18 (07 Aug 2024 16:07) (18 - 18)  SpO2: 100% (07 Aug 2024 16:07) (100% - 100%)    Parameters below as of 07 Aug 2024 16:07  Patient On (Oxygen Delivery Method): room air        08-06-24 @ 07:01  -  08-07-24 @ 07:00  --------------------------------------------------------  IN: 500 mL / OUT: 0 mL / NET: 500 mL    08-07-24 @ 07:01  -  08-07-24 @ 16:33  --------------------------------------------------------  IN: 240 mL / OUT: 500 mL / NET: -260 mL      CONSTITUTIONAL: Well-groomed, in no apparent distress;  EYES: No conjunctival or scleral injection, non-icteric;  ENMT: No external nasal lesions; Normal outer ears;  NECK: Trachea midline;  RESPIRATORY: Normal respiratory effort;   CARDIOVASCULAR: Regular rate and rhythm;  GASTROINTESTINAL: Non-distended; No palpable masses; No rebound/guarding;  EXTREMITIES:  No lower extremity edema;  NEUROLOGY: A+O to person, place, and time; Does respond to commands appropriately;  PSYCHIATRY: Mood and Affect appropriate    LABS:                        8.4    4.87  )-----------( 210      ( 07 Aug 2024 06:59 )             26.8     08-07    138  |  105  |  7   ----------------------------<  97  3.8   |  23  |  0.73    Ca    8.5      07 Aug 2024 06:59  Phos  3.8     08-06  Mg     1.7     08-06    TPro  3.7<L>  /  Alb  2.2<L>  /  TBili  0.3  /  DBili  x   /  AST  14  /  ALT  8<L>  /  AlkPhos  50  08-06          Urinalysis Basic - ( 07 Aug 2024 06:59 )    Color: x / Appearance: x / SG: x / pH: x  Gluc: 97 mg/dL / Ketone: x  / Bili: x / Urobili: x   Blood: x / Protein: x / Nitrite: x   Leuk Esterase: x / RBC: x / WBC x   Sq Epi: x / Non Sq Epi: x / Bacteria: x            RADIOLOGY & ADDITIONAL TESTS:  EKG

## 2024-08-07 NOTE — PROGRESS NOTE ADULT - SUBJECTIVE AND OBJECTIVE BOX
Gastroenterology/Hepatology Progress Note      Interval Events:   - Patient started on tube feeds and tolerating well at 20 cc/hr.   - Has abd discomfort at the peg tube site.   - Was draining her ascites this morning.   - No fevers or chills.     Allergies:  Eggplant (Rash)  No Known Drug Allergies  cefazolin (Nausea)      Hospital Medications:  acetaminophen     Tablet .. 650 milliGRAM(s) Oral every 6 hours PRN  aluminum hydroxide/magnesium hydroxide/simethicone Suspension 30 milliLiter(s) Oral every 4 hours PRN  clindamycin IVPB 600 milliGRAM(s) IV Intermittent once  enoxaparin Injectable 40 milliGRAM(s) SubCutaneous every 24 hours  melatonin 3 milliGRAM(s) Oral at bedtime PRN  ondansetron Injectable 4 milliGRAM(s) IV Push every 8 hours PRN  pantoprazole   Suspension 40 milliGRAM(s) Oral daily  zolpidem 5 milliGRAM(s) Oral at bedtime PRN      ROS: 14 point ROS negative unless otherwise state in subjective    PHYSICAL EXAM:   Vital Signs:  Vital Signs Last 24 Hrs  T(C): 36.5 (07 Aug 2024 08:44), Max: 36.6 (07 Aug 2024 00:00)  T(F): 97.7 (07 Aug 2024 08:44), Max: 97.8 (07 Aug 2024 00:00)  HR: 107 (07 Aug 2024 08:44) (80 - 107)  BP: 91/63 (07 Aug 2024 08:44) (86/55 - 93/60)  BP(mean): --  RR: 18 (07 Aug 2024 08:44) (18 - 18)  SpO2: 100% (07 Aug 2024 08:44) (100% - 100%)    Parameters below as of 07 Aug 2024 08:44  Patient On (Oxygen Delivery Method): room air      Daily     Daily     PHYSICAL EXAM:     GENERAL:  No acute distress, cachectic, chronically ill appearing, lying in bed.   HEENT:  NCAT, no scleral icterus   CHEST:  no respiratory distress  HEART:  Regular rate and rhythm  ABDOMEN:  Soft, has PEG tube in place, external bumper at 1.5 cm, with no bleeding at the site, mild tenderness at the site, no drainage around the site, non distended, also has peritoneal drain placed in the RUQ.   EXTREMITIES: No LE edema  SKIN:  No rash/erythema/ecchymoses/petechiae/wounds/abscess/warm/dry  NEURO:  Alert and oriented x 3, no tremors.     LABS:                        8.4    4.87  )-----------( 210      ( 07 Aug 2024 06:59 )             26.8     Mean Cell Volume: 92.1 fl (08-07-24 @ 06:59)    08-07    138  |  105  |  7   ----------------------------<  97  3.8   |  23  |  0.73    Ca    8.5      07 Aug 2024 06:59  Phos  3.8     08-06  Mg     1.7     08-06    TPro  3.7<L>  /  Alb  2.2<L>  /  TBili  0.3  /  DBili  x   /  AST  14  /  ALT  8<L>  /  AlkPhos  50  08-06    LIVER FUNCTIONS - ( 06 Aug 2024 06:58 )  Alb: 2.2 g/dL / Pro: 3.7 g/dL / ALK PHOS: 50 U/L / ALT: 8 U/L / AST: 14 U/L / GGT: x             Urinalysis Basic - ( 07 Aug 2024 06:59 )    Color: x / Appearance: x / SG: x / pH: x  Gluc: 97 mg/dL / Ketone: x  / Bili: x / Urobili: x   Blood: x / Protein: x / Nitrite: x   Leuk Esterase: x / RBC: x / WBC x   Sq Epi: x / Non Sq Epi: x / Bacteria: x            Imaging:      EGD on 8/5      Impression:          - Gastroesophageal flap valve classified as Hill Grade IV (no fold, wide open                        lumen, hiatal hernia present).                       - 3 cm hiatal hernia.                       - Normal stomach.                       - Normal first portion of the duodenum.                       - Duodenal mucosal atrophy.                       - An externally removable 20 Fr PEG placement was successfully completed.                       - No specimens collected.

## 2024-08-07 NOTE — PROGRESS NOTE ADULT - ATTENDING COMMENTS
As above.    Patient seen and examined in the early evening  Discussed with GI fellow earlier in the day.    Impression:    #1.  Metastatic lung cancer on palliative treatment, with malnutrition, episodes of mucositis, and malignant ascites status post indwelling percutaneous drain which allows for self removal of ascites  #2.  Had outpatient upper endoscopy with PEG placement 8/5/24, admitted postprocedure for observation, nutrition consultation, nursing teaching of PEG care.  #3.  Abdominal pain/tenderness at/around PEG site, no bleeding/leaking/erythema/discharge.  Pain described as severe, but improving with time.  Tylenol use but no narcotics./  #4.  Nausea and regurgitation are limiting rapid advancement of tube feeds, currently at 30 mL/hr and tolerating.    Recommendations:    #1.  I loosened the external bumper from 1.5 cm at skin to 2 cm at skin.  She had expected temporary worsening of pain with subsequent return to near baseline within minutes.  #2.  CT scan abd/pelvis with gastrograffin contrast via G-tube and IV contrast to look for leak, abdominal wall problems, and confirm good position of G-tube.  Obtain tonight via urgent order to radiology.  Discussed with medicine ACP during my evening rounds around 7 PM.

## 2024-08-07 NOTE — CONSULT NOTE ADULT - ASSESSMENT
63F with a history of lung cancer (currently receiving experimental therapy) who presented for an outpatient PEG tube placement. She is now admitted to medicine for further monitoring while tube feeds are initiated, and home care is arranged.     Stage 4 metastatic NSCLC, adenocarcinoma  - On protocol , LD 7/31. No treatment while admitted.   - Follows with Dr. Stanford at [facility] [place]. Previously had POD on multiple lines of therapy (osimertinib, pemetrexed, carboplatin and bevacizumab, weekly [place], docetaxel w/ afatinib)  - S/p EGD with peg placement 8/5, tube feeds initiated by GI. Nutrition eval.    Will continue to follow.    Zack Fry PA-C  Hematology/Oncology  [facility]  [phone] (office)
Impression:    63 yrs old female w/ hx lung cancer c/w malignant ascites s/p peritoneal drain placement (currently on experimental therapy) who presented for o/p PEG tube placement, will need nutrition consult and home care prior to discharge    #PEG placement  #Lung cancer c/w malignant ascites s/p Pleurx catheter   s/p EGD with PEG placement on 8/5    Recommendations:   - Can place her on CLD for now.   - Can start the patient on medication and water through the peg tube.   - Please consult nutrition.   - Will follow up tomorrow prior to starting her on tube feeds.   - CMP and CBC daily.     Discussed the case with Dr. Connolly.     All recommendations are tentative until the note is attested by an attending.     David Juarez, PGY-6  Gastroenterology/Hepatology Fellow  Available on [employer] Teams  [phone] (Short Range Pager)  [phone] (Long Range Pager)    After 5pm, please contact the on-call GI fellow.

## 2024-08-07 NOTE — PATIENT PROFILE ADULT - FALL HARM RISK - HARM RISK INTERVENTIONS

## 2024-08-08 PROBLEM — E43 UNSPECIFIED SEVERE PROTEIN-CALORIE MALNUTRITION: Chronic | Status: ACTIVE | Noted: 2024-07-30

## 2024-08-08 PROBLEM — R13.10 DYSPHAGIA, UNSPECIFIED: Chronic | Status: ACTIVE | Noted: 2024-07-30

## 2024-08-08 PROBLEM — K57.90 DIVERTICULOSIS OF INTESTINE, PART UNSPECIFIED, WITHOUT PERFORATION OR ABSCESS WITHOUT BLEEDING: Chronic | Status: ACTIVE | Noted: 2024-07-30

## 2024-08-08 PROBLEM — G43.909 MIGRAINE, UNSPECIFIED, NOT INTRACTABLE, WITHOUT STATUS MIGRAINOSUS: Chronic | Status: ACTIVE | Noted: 2024-07-30

## 2024-08-08 PROBLEM — R22.30 LOCALIZED SWELLING, MASS AND LUMP, UNSPECIFIED UPPER LIMB: Chronic | Status: ACTIVE | Noted: 2024-07-30

## 2024-08-08 PROBLEM — J45.909 UNSPECIFIED ASTHMA, UNCOMPLICATED: Chronic | Status: ACTIVE | Noted: 2024-07-30

## 2024-08-08 PROBLEM — K59.00 CONSTIPATION, UNSPECIFIED: Chronic | Status: ACTIVE | Noted: 2024-07-30

## 2024-08-08 PROBLEM — C34.90 MALIGNANT NEOPLASM OF UNSPECIFIED PART OF UNSPECIFIED BRONCHUS OR LUNG: Chronic | Status: ACTIVE | Noted: 2024-07-30

## 2024-08-08 PROBLEM — T80.219A: Chronic | Status: ACTIVE | Noted: 2024-07-30

## 2024-08-08 LAB
ANION GAP SERPL CALC-SCNC: 9 MMOL/L — SIGNIFICANT CHANGE UP (ref 5–17)
BUN SERPL-MCNC: 10 MG/DL — SIGNIFICANT CHANGE UP (ref 7–23)
CALCIUM SERPL-MCNC: 8.5 MG/DL — SIGNIFICANT CHANGE UP (ref 8.4–10.5)
CHLORIDE SERPL-SCNC: 105 MMOL/L — SIGNIFICANT CHANGE UP (ref 96–108)
CO2 SERPL-SCNC: 25 MMOL/L — SIGNIFICANT CHANGE UP (ref 22–31)
CREAT SERPL-MCNC: 0.68 MG/DL — SIGNIFICANT CHANGE UP (ref 0.5–1.3)
EGFR: 98 ML/MIN/1.73M2 — SIGNIFICANT CHANGE UP
GLUCOSE SERPL-MCNC: 103 MG/DL — HIGH (ref 70–99)
HCT VFR BLD CALC: 26.3 % — LOW (ref 34.5–45)
HGB BLD-MCNC: 8.4 G/DL — LOW (ref 11.5–15.5)
MAGNESIUM SERPL-MCNC: 1.8 MG/DL — SIGNIFICANT CHANGE UP (ref 1.6–2.6)
MCHC RBC-ENTMCNC: 29.2 PG — SIGNIFICANT CHANGE UP (ref 27–34)
MCHC RBC-ENTMCNC: 31.9 GM/DL — LOW (ref 32–36)
MCV RBC AUTO: 91.3 FL — SIGNIFICANT CHANGE UP (ref 80–100)
NRBC # BLD: 0 /100 WBCS — SIGNIFICANT CHANGE UP (ref 0–0)
PLATELET # BLD AUTO: 198 K/UL — SIGNIFICANT CHANGE UP (ref 150–400)
POTASSIUM SERPL-MCNC: 4.1 MMOL/L — SIGNIFICANT CHANGE UP (ref 3.5–5.3)
POTASSIUM SERPL-SCNC: 4.1 MMOL/L — SIGNIFICANT CHANGE UP (ref 3.5–5.3)
RBC # BLD: 2.88 M/UL — LOW (ref 3.8–5.2)
RBC # FLD: 23.3 % — HIGH (ref 10.3–14.5)
SODIUM SERPL-SCNC: 139 MMOL/L — SIGNIFICANT CHANGE UP (ref 135–145)
WBC # BLD: 4.74 K/UL — SIGNIFICANT CHANGE UP (ref 3.8–10.5)
WBC # FLD AUTO: 4.74 K/UL — SIGNIFICANT CHANGE UP (ref 3.8–10.5)

## 2024-08-08 PROCEDURE — 99232 SBSQ HOSP IP/OBS MODERATE 35: CPT

## 2024-08-08 PROCEDURE — 74177 CT ABD & PELVIS W/CONTRAST: CPT | Mod: 26

## 2024-08-08 RX ADMIN — ENOXAPARIN SODIUM 40 MILLIGRAM(S): 120 INJECTION SUBCUTANEOUS at 05:46

## 2024-08-08 RX ADMIN — Medication 30 MILLILITER(S): at 05:46

## 2024-08-08 NOTE — PROGRESS NOTE ADULT - PROBLEM SELECTOR PLAN 1
Per patient and , her oncologist Dr. Stanford ([facility] [place]) recommended PEG placement in the setting of malnutrition and rapid weight loss; the patient does report that she does not have difficulty swallowing and normally is able to consume a soft diet and water  - per the patient, will continue PO diet for now  - supplemental tube feedings to commence following nutrition recs  - GI cleared PEg for use 8/6
Per patient and , her oncologist Dr. Stanford ([facility] [place]) recommended PEG placement in the setting of malnutrition and rapid weight loss; the patient does report that she does not have difficulty swallowing and normally is able to consume a soft diet and water  - per the patient, will continue PO diet for now  - started tube feeds 8/6. tolerating at 20 ml/hr. Goal 40 ml/hr
Per patient and , her oncologist Dr. Stanford ([facility] [place]) recommended PEG placement in the setting of malnutrition and rapid weight loss; the patient does report that she does not have difficulty swallowing and normally is able to consume a soft diet and water  - per the patient, will continue PO diet for now  - started tube feeds 8/6. tolerating at 40 ml/h  - CTAP w/oral contrast to eval PEG site pain

## 2024-08-08 NOTE — CHART NOTE - NSCHARTNOTEFT_GEN_A_CORE
NUTRITION FOLLOW UP NOTE    PATIENT SEEN FOR: 1st Malnutrition Follow Up and Re-consult for Tube Feeding     SOURCE: [X] Patient  [x] Current Medical Record  [] RN  [] Family/support person at bedside  [] Patient unavailable/inappropriate  [] Other:    CHART REVIEWED/EVENTS NOTED.  [X] No changes to nutrition care plan to note  [X] Nutrition Status:  -History of lung cancer (currently receiving experimental therapy)  - PEG placed 8/6/24     DIET ORDER:   Diet, Regular:   Soft and Bite Sized (SOFTBTSZ)  Tube Feeding Modality: Gastrostomy  Jevity 1.5 [place] (JEVITY1.5RTH)  Total Volume for 24 Hours (mL): 960  Continuous  Starting Tube Feed Rate {mL per Hour}: 10  Increase Tube Feed Rate by (mL): 10     Every 12 hours  Until Goal Tube Feed Rate (mL per Hour): 40  Tube Feed Duration (in Hours): 24  Tube Feed Start Time: 18:00 (08-06-24)      CURRENT DIET ORDER IS:  [X] Appropriate:  [] Inadequate:  [] Other:    NUTRITION INTAKE/PROVISION:  [X] PO: Diet was upgraded on 8/6/24, RD spoke to patient, she reports not tolerating PO diet thus far  [X] Enteral Nutrition: EN Order Provides:  provides 960 mL total volume, 730 mL free water, 1440 kcal/d and 61g pro/day (30.25 kcal/kg and 1.28 g/kg) based on IBW weight 47.6 kg  Current Pump Rate: 40ml/hr   EN provision: 26 % EN volume goal provided in past 2 days   [] Parenteral Nutrition:    ANTHROPOMETRICS:  Drug Dosing Weight  Height (cm): 154.9 (05 Aug 2024 13:13)  Weight (kg): 37.2 (05 Aug 2024 13:13)  BMI (kg/m2): 15.5 (05 Aug 2024 13:13)  Weights:   Daily      NUTRITIONALLY PERTINENT MEDICATIONS  (STANDING):  clindamycin IVPB 600 milliGRAM(s) IV Intermittent once  enoxaparin Injectable 40 milliGRAM(s) SubCutaneous every 24 hours  pantoprazole   Suspension 40 milliGRAM(s) Oral daily    MEDICATIONS  (PRN):  acetaminophen     Tablet .. 650 milliGRAM(s) Oral every 6 hours PRN Temp greater or equal to 38C (100.4F), Mild Pain (1 - 3)  aluminum hydroxide/magnesium hydroxide/simethicone Suspension 30 milliLiter(s) Oral every 4 hours PRN Dyspepsia  melatonin 3 milliGRAM(s) Oral at bedtime PRN Insomnia  ondansetron Injectable 4 milliGRAM(s) IV Push every 8 hours PRN Nausea and/or Vomiting  zolpidem 5 milliGRAM(s) Oral at bedtime PRN Insomnia      NUTRITIONALLY PERTINENT LABS:  08-08 Na139 mmol/L Glu 103 mg/dL<H> K+ 4.1 mmol/L Cr  0.68 mg/dL BUN 10 mg/dL 08-06 Phos 3.8 mg/dL 08-06 Alb 2.2 g/dL<L>08-06 ALT 8 U/L<L> AST 14 U/L Alkaline Phosphatase 50 U/L    Finger Sticks: N/A      NUTRITIONALLY PERTINENT MEDICATIONS/LABS:  [x] Reviewed  [X] Relevant notes on medications/labs:  -  Pt at risk for refeeding syndrome; electrolytes are within normal range 8/8/24  - on IV Antibiotics   - Zofran for nausea       EDEMA:  [x] Reviewed  [] Relevant notes:    GI/ I&O:  [x] Reviewed  [] Relevant notes:  [] Other:    SKIN:   [X] No pressure injuries documented, per nursing flowsheet  [] Pressure injury previously noted  [] Change in pressure injury documentation:  [] Other:    ESTIMATED NEEDS:  [X] No change:  Energy: 6314-0168  kcal/day (30-35 kcal/kg)  Protein: 57.12-66.64  g/day (1.2-1.4 g/kg)  Fluid:   ml/day or [X] defer to team  Based on: IBW of 105 lbs/ 47.6 kg  [] Updated:  Energy:   kcal/day (xx-xx kcal/kg)  Protein:   g/day (xx-xx g/kg)  Fluid:   ml/day or [] defer to team  Based on:    NUTRITION DIAGNOSIS:  [X] Prior Dx: Chronic Severe Malnutrition; Underweight   [] New Dx:    EDUCATION:  [X] Yes: Reviewed with pt that current tube feeding regimen would be meeting 100% of her estimated energy and protein needs   [] Not appropriate/warranted    NUTRITION CARE PLAN:   In preparation for discharge:  1. Consider changing tube feeding to bolus of  Jevity 1.5 @ 240 ml 4x/day Q6H. Regimen at goal provides 960 mL total volume, 730 mL free water, 1440 kcal/d and 61g pro/day (30.25 kcal/kg and 1.28 g/kg) based on IBW weight 47.6 kg  - Defer free water flush to medical team.   2. Trend electrolytes closely and replete as indicated   3. Add Multivitamin and Thiamine daily in setting of refeeding risk   4. Defer diet/texture advancement to medical team/SLP as indicated   5. RD remains available to adjust EN formulary, volume/rate    [] Achieved - Continue current nutrition intervention(s)  [] Current medical condition precludes nutrition intervention at this time.    MONITORING AND EVALUATION:   RD remains available upon request and will follow up per protocol.    Diana Ansari, MS,RDN,CDN   Available on [place] TEAMS

## 2024-08-08 NOTE — PROGRESS NOTE ADULT - PROBLEM SELECTOR PLAN 4
Reports she drains ~500cc of fluid daily (was advised to not drain more than this, possibly because causes her to become hypotensive?)  - ensure nursing is aware to drain PleurX daily
Reports she drains ~500cc of fluid daily (was advised to not drain more than this, possibly because causes her to become hypotensive?)  - ensure nursing is aware to drain peritoneal tube daily
Reports she drains ~500cc of fluid daily (was advised to not drain more than this, possibly because causes her to become hypotensive?)  - ensure nursing is aware to drain peritoneal tube daily

## 2024-08-08 NOTE — PROVIDER CONTACT NOTE (OTHER) - SITUATION
Pt refusing feeds overnight. Pt stating that it gave her acid reflux overnight and would rather restart feeds in AM

## 2024-08-08 NOTE — PROGRESS NOTE ADULT - NS ATTEND AMEND GEN_ALL_CORE FT
Agree with above assessment and plan.   Stage IV NSCLC under the care of Dr Manjinder Stanford. Currently on clinical trial. No plans for treatment inpatient. Here for PEG placement which was done. [place] [facility]

## 2024-08-08 NOTE — PROGRESS NOTE ADULT - PROBLEM SELECTOR PLAN 6
DVT ppx - Lovenox (high risk for DVT given malignancy)  Diet - soft with thin liquids; nutrition consult pending for PEG tube feeding recs  Dispo - home once services to help with tube feeds  Code status - DNR/DNI
DVT ppx - Lovenox (high risk for DVT given malignancy)  Diet - soft with thin liquids; nutrition consult pending for PEG tube feeding recs  Dispo - home once services to help with tube feeds, pend feeding rate tolerance  Code status - DNR/DNI
DVT ppx - Lovenox (high risk for DVT given malignancy)  Diet - soft with thin liquids; nutrition consult pending for PEG tube feeding recs  Dispo - home once services are arranged to help with tube feeds  Code status - DNR/DNI

## 2024-08-08 NOTE — PROGRESS NOTE ADULT - TIME BILLING
- Ordering, reviewing, and/or interpreting labs, testing, and/or imaging  - Independently obtaining a review of systems and performing a physical exam  - Reviewing prior records and where necessary, outpatient records  - Counselling and educating patient and/or family regarding interpretation of aforementioned items and plan of care
H&P, review of labs, coordination of care with medicine ACP, written documentation.
- Ordering, reviewing, and/or interpreting labs, testing, and/or imaging  - Independently obtaining a review of systems and performing a physical exam  - Reviewing prior records and where necessary, outpatient records  - Counselling and educating patient and/or family regarding interpretation of aforementioned items and plan of care
- Ordering, reviewing, and/or interpreting labs, testing, and/or imaging  - Independently obtaining a review of systems and performing a physical exam  - Reviewing prior records and where necessary, outpatient records  - Counselling and educating patient and/or family regarding interpretation of aforementioned items and plan of care

## 2024-08-08 NOTE — PROGRESS NOTE ADULT - SUBJECTIVE AND OBJECTIVE BOX
SUBJECTIVE / OVERNIGHT EVENTS:  Today is hospital day 3d. There are no new issues or overnight events. Tolerating 40 ml/hr  Did not endorse any headache, lightheadedness, vertigo, shortness of breathe, cough, chest pain, palpitations, tachycardia, abdominal pain, nausea, vomiting, diarrhea or constipation currently    HPI:  63F with a history of lung cancer (currently receiving experimental therapy) who presented for an outpatient PEG tube placement. She is now admitted to medicine for further monitoring while tube feeds are initiated, and home care is arranged. She reports that she had been undergoing rapid weight loss (~20 lb over the past few months), and her oncologist had suggested she get a PEG tube. She reports pain with swallowing, though denies dysphagia. States she normally eats soft foods and is able to swallow water without difficulty. She was diagnosed with lung cancer in 2021 - she has progressed through 3 lines of therapy and is now on an experimental agent. She's unsure if it is metastatic. She reports placement of a PleurX in the setting of malignant ascites - states that she drains ~500ccs of fluid daily (reportedly there is more fluid remaining, though her providers have advised her to not drain more than 500 ccs). She denies acute complaints at this time. (05 Aug 2024 15:07)    MEDICATIONS  (STANDING):  clindamycin IVPB 600 milliGRAM(s) IV Intermittent once  enoxaparin Injectable 40 milliGRAM(s) SubCutaneous every 24 hours  pantoprazole   Suspension 40 milliGRAM(s) Oral daily    MEDICATIONS  (PRN):  acetaminophen     Tablet .. 650 milliGRAM(s) Oral every 6 hours PRN Temp greater or equal to 38C (100.4F), Mild Pain (1 - 3)  aluminum hydroxide/magnesium hydroxide/simethicone Suspension 30 milliLiter(s) Oral every 4 hours PRN Dyspepsia  melatonin 3 milliGRAM(s) Oral at bedtime PRN Insomnia  ondansetron Injectable 4 milliGRAM(s) IV Push every 8 hours PRN Nausea and/or Vomiting  zolpidem 5 milliGRAM(s) Oral at bedtime PRN Insomnia    HOME MEDICATIONS:  Ambien 5 mg oral tablet: 1 tab(s) orally once a day (at bedtime) as needed for  insomnia  metoclopramide 10 mg oral tablet: 1 tab(s) orally every 6 hours as needed for  nausea  MiraLax oral powder for reconstitution: 17 gram(s) orally once a day as needed for  constipation  omeprazole 40 mg oral delayed release capsule: 1 cap(s) orally once a day  ondansetron 8 mg oral tablet: 1 tab(s) orally every 8 hours as needed for  nausea  senna: 2 tablet with sensor once a day as needed for  constipation  Tums 500 mg oral tablet, chewable: 1 tab(s) chewed once a day  Tylenol 500 mg oral tablet: 2 tab(s) orally every 6 hours    PHYSICAL EXAM  Vital Signs Last 24 Hrs  T(C): 36.4 (08 Aug 2024 09:06), Max: 36.5 (08 Aug 2024 01:08)  T(F): 97.6 (08 Aug 2024 09:06), Max: 97.7 (08 Aug 2024 01:08)  HR: 93 (08 Aug 2024 09:06) (82 - 100)  BP: 97/63 (08 Aug 2024 09:06) (82/58 - 108/66)  BP(mean): --  RR: 18 (08 Aug 2024 09:06) (18 - 18)  SpO2: 99% (08 Aug 2024 09:06) (99% - 100%)    Parameters below as of 08 Aug 2024 09:06  Patient On (Oxygen Delivery Method): room air        08-07-24 @ 07:01  -  08-08-24 @ 07:00  --------------------------------------------------------  IN: 490 mL / OUT: 500 mL / NET: -10 mL    08-08-24 @ 07:01  -  08-08-24 @ 14:52  --------------------------------------------------------  IN: 0 mL / OUT: 500 mL / NET: -500 mL      CONSTITUTIONAL: Well-groomed, in no apparent distress;  EYES: No conjunctival or scleral injection, non-icteric;  ENMT: No external nasal lesions; Normal outer ears;  NECK: Trachea midline;  RESPIRATORY: Normal respiratory effort;  CARDIOVASCULAR: Regular rate and rhythm;  GASTROINTESTINAL: Non-distended; No palpable masses; No rebound/guarding;  EXTREMITIES:  No lower extremity edema;  NEUROLOGY: A+O to person, place, and time; Does respond to commands appropriately;  PSYCHIATRY: Mood and Affect appropriate    LABS:                        8.4    4.74  )-----------( 198      ( 08 Aug 2024 07:27 )             26.3     08-08    139  |  105  |  10  ----------------------------<  103<H>  4.1   |  25  |  0.68    Ca    8.5      08 Aug 2024 07:25  Mg     1.8     08-08            Urinalysis Basic - ( 08 Aug 2024 07:25 )    Color: x / Appearance: x / SG: x / pH: x  Gluc: 103 mg/dL / Ketone: x  / Bili: x / Urobili: x   Blood: x / Protein: x / Nitrite: x   Leuk Esterase: x / RBC: x / WBC x   Sq Epi: x / Non Sq Epi: x / Bacteria: x            RADIOLOGY & ADDITIONAL TESTS:  EKG

## 2024-08-08 NOTE — PROGRESS NOTE ADULT - SUBJECTIVE AND OBJECTIVE BOX
Sochi Patient is a 63y old  Female who presents with a chief complaint of Home care set up, initiation of PEG tube feeds (08 Aug 2024 09:18)    Patient seen and examined  Reports ongoing nausea and abdominal discomfort    MEDICATIONS  (STANDING):  clindamycin IVPB 600 milliGRAM(s) IV Intermittent once  enoxaparin Injectable 40 milliGRAM(s) SubCutaneous every 24 hours  pantoprazole   Suspension 40 milliGRAM(s) Oral daily    MEDICATIONS  (PRN):  acetaminophen     Tablet .. 650 milliGRAM(s) Oral every 6 hours PRN Temp greater or equal to 38C (100.4F), Mild Pain (1 - 3)  aluminum hydroxide/magnesium hydroxide/simethicone Suspension 30 milliLiter(s) Oral every 4 hours PRN Dyspepsia  melatonin 3 milliGRAM(s) Oral at bedtime PRN Insomnia  ondansetron Injectable 4 milliGRAM(s) IV Push every 8 hours PRN Nausea and/or Vomiting  zolpidem 5 milliGRAM(s) Oral at bedtime PRN Insomnia      Vital Signs Last 24 Hrs  T(C): 36.4 (08 Aug 2024 09:06), Max: 36.5 (08 Aug 2024 01:08)  T(F): 97.6 (08 Aug 2024 09:06), Max: 97.7 (08 Aug 2024 01:08)  HR: 93 (08 Aug 2024 09:06) (82 - 100)  BP: 97/63 (08 Aug 2024 09:06) (82/58 - 108/66)  BP(mean): --  RR: 18 (08 Aug 2024 09:06) (18 - 18)  SpO2: 99% (08 Aug 2024 09:06) (99% - 100%)    Parameters below as of 08 Aug 2024 09:06  Patient On (Oxygen Delivery Method): room air      PE  Awake, alert  Anicteric, MMM  RRR  CTAB  Abd soft, NT, ND  No c/c                        8.4    4.74  )-----------( 198      ( 08 Aug 2024 07:27 )             26.3       08-08    139  |  105  |  10  ----------------------------<  103<H>  4.1   |  25  |  0.68    Ca    8.5      08 Aug 2024 07:25  Mg     1.8     08-08

## 2024-08-08 NOTE — PROGRESS NOTE ADULT - PROBLEM SELECTOR PROBLEM 1
S/P percutaneous endoscopic gastrostomy (PEG) tube placement

## 2024-08-08 NOTE — PROGRESS NOTE ADULT - PROBLEM SELECTOR PLAN 3
Reports progressing through 3 lines of therapy, now on an experiemental agent  - ensure continued [place] Stanford [place] [place]
Reports progressing through 3 lines of therapy, now on an experiemental agent  - [place] [place]
Reports progressing through 3 lines of therapy, now on an experiemental agent  - [place] [place]

## 2024-08-08 NOTE — PROGRESS NOTE ADULT - SUBJECTIVE AND OBJECTIVE BOX
Gastroenterology/Hepatology Progress Note      Interval Events:     Allergies:  Eggplant (Rash)  No Known Drug Allergies  cefazolin (Nausea)      Hospital Medications:  acetaminophen     Tablet .. 650 milliGRAM(s) Oral every 6 hours PRN  aluminum hydroxide/magnesium hydroxide/simethicone Suspension 30 milliLiter(s) Oral every 4 hours PRN  clindamycin IVPB 600 milliGRAM(s) IV Intermittent once  enoxaparin Injectable 40 milliGRAM(s) SubCutaneous every 24 hours  melatonin 3 milliGRAM(s) Oral at bedtime PRN  ondansetron Injectable 4 milliGRAM(s) IV Push every 8 hours PRN  pantoprazole   Suspension 40 milliGRAM(s) Oral daily  zolpidem 5 milliGRAM(s) Oral at bedtime PRN      ROS: 14 point ROS negative unless otherwise state in subjective    PHYSICAL EXAM:   Vital Signs:  Vital Signs Last 24 Hrs  T(C): 36.4 (08 Aug 2024 09:06), Max: 36.5 (08 Aug 2024 01:08)  T(F): 97.6 (08 Aug 2024 09:06), Max: 97.7 (08 Aug 2024 01:08)  HR: 93 (08 Aug 2024 09:06) (82 - 100)  BP: 97/63 (08 Aug 2024 09:06) (82/58 - 108/66)  BP(mean): --  RR: 18 (08 Aug 2024 09:06) (18 - 18)  SpO2: 99% (08 Aug 2024 09:06) (99% - 100%)    Parameters below as of 08 Aug 2024 09:06  Patient On (Oxygen Delivery Method): room air      Daily     Daily     GENERAL:  No acute distress  HEENT:  NCAT, no scleral icterus  CHEST: no resp distress  HEART:  RRR  ABDOMEN:  Soft, non-tender, non-distended, normoactive bowel sounds, no masses  EXTREMITIES:  No cyanosis, clubbing, or edema  SKIN:  No rash/erythema/ecchymoses/petechiae/wounds/abscess/warm/dry  NEURO:  Alert and oriented x 3, no asterixis, no tremor    LABS:                        8.4    4.74  )-----------( 198      ( 08 Aug 2024 07:27 )             26.3     Mean Cell Volume: 91.3 fl (08-08-24 @ 07:27)    08-08    139  |  105  |  10  ----------------------------<  103<H>  4.1   |  25  |  0.68    Ca    8.5      08 Aug 2024 07:25  Mg     1.8     08-08          Urinalysis Basic - ( 08 Aug 2024 07:25 )    Color: x / Appearance: x / SG: x / pH: x  Gluc: 103 mg/dL / Ketone: x  / Bili: x / Urobili: x   Blood: x / Protein: x / Nitrite: x   Leuk Esterase: x / RBC: x / WBC x   Sq Epi: x / Non Sq Epi: x / Bacteria: x            Imaging:           Gastroenterology/Hepatology Progress Note      Interval Events:     - s/p PEG placement, had pain at the PEG site, the bumper was loosened to 2 cm.   - currently she is running tube feeds at 40 cc/hr, and has nausea, but no vomiting.   - No fevers or chills.   - Also tolerating po clear liquid diet.     Allergies:  Eggplant (Rash)  No Known Drug Allergies  cefazolin (Nausea)      Hospital Medications:  acetaminophen     Tablet .. 650 milliGRAM(s) Oral every 6 hours PRN  aluminum hydroxide/magnesium hydroxide/simethicone Suspension 30 milliLiter(s) Oral every 4 hours PRN  clindamycin IVPB 600 milliGRAM(s) IV Intermittent once  enoxaparin Injectable 40 milliGRAM(s) SubCutaneous every 24 hours  melatonin 3 milliGRAM(s) Oral at bedtime PRN  ondansetron Injectable 4 milliGRAM(s) IV Push every 8 hours PRN  pantoprazole   Suspension 40 milliGRAM(s) Oral daily  zolpidem 5 milliGRAM(s) Oral at bedtime PRN      ROS: 14 point ROS negative unless otherwise state in subjective    PHYSICAL EXAM:   Vital Signs:  Vital Signs Last 24 Hrs  T(C): 36.4 (08 Aug 2024 09:06), Max: 36.5 (08 Aug 2024 01:08)  T(F): 97.6 (08 Aug 2024 09:06), Max: 97.7 (08 Aug 2024 01:08)  HR: 93 (08 Aug 2024 09:06) (82 - 100)  BP: 97/63 (08 Aug 2024 09:06) (82/58 - 108/66)  BP(mean): --  RR: 18 (08 Aug 2024 09:06) (18 - 18)  SpO2: 99% (08 Aug 2024 09:06) (99% - 100%)    Parameters below as of 08 Aug 2024 09:06  Patient On (Oxygen Delivery Method): room air      Daily     Daily     PHYSICAL EXAM:     GENERAL:  No acute distress, cachectic, chronically ill appearing, lying in bed.   HEENT:  NCAT, no scleral icterus   CHEST:  no respiratory distress  HEART:  Regular rate and rhythm  ABDOMEN:  Soft, has PEG tube in place, external bumper at 2 cm cm, with no bleeding at the site, mild tenderness at the site, no drainage around the site, non distended, also has peritoneal drain placed in the RUQ.   EXTREMITIES: No LE edema  SKIN:  No rash/erythema/ecchymoses/petechiae/wounds/abscess/warm/dry  NEURO:  Alert and oriented x 3, no tremors.     LABS:                        8.4    4.74  )-----------( 198      ( 08 Aug 2024 07:27 )             26.3     Mean Cell Volume: 91.3 fl (08-08-24 @ 07:27)    08-08    139  |  105  |  10  ----------------------------<  103<H>  4.1   |  25  |  0.68    Ca    8.5      08 Aug 2024 07:25  Mg     1.8     08-08          Urinalysis Basic - ( 08 Aug 2024 07:25 )    Color: x / Appearance: x / SG: x / pH: x  Gluc: 103 mg/dL / Ketone: x  / Bili: x / Urobili: x   Blood: x / Protein: x / Nitrite: x   Leuk Esterase: x / RBC: x / WBC x   Sq Epi: x / Non Sq Epi: x / Bacteria: x            Imaging:        EGD on 8/5      Impression:          - Gastroesophageal flap valve classified as Hill Grade IV (no fold, wide open                        lumen, hiatal hernia present).                       - 3 cm hiatal hernia.                       - Normal stomach.                       - Normal first portion of the duodenum.                       - Duodenal mucosal atrophy.                       - An externally removable 20 Fr PEG placement was successfully completed.                       - No specimens collected.

## 2024-08-08 NOTE — PROGRESS NOTE ADULT - PROBLEM SELECTOR PLAN 5
Noted that the patient's blood pressure is normally 80's/50's - confirmed with patient and , and prior outpatient GI visit in [facility]  - monitor
Noted that the patient's blood pressure is normally 80's/50's - confirmed with patient and , and prior outpatient GI visit in [facility]  - monitor
Noted that the patient's blood pressure is normally 80's/50's - confirmed with patient and , and prior outpatient GI visit in [facility]  - monitor

## 2024-08-09 ENCOUNTER — TRANSCRIPTION ENCOUNTER (OUTPATIENT)
Age: 64
End: 2024-08-09

## 2024-08-09 VITALS
RESPIRATION RATE: 18 BRPM | SYSTOLIC BLOOD PRESSURE: 87 MMHG | TEMPERATURE: 98 F | HEART RATE: 100 BPM | DIASTOLIC BLOOD PRESSURE: 60 MMHG | OXYGEN SATURATION: 97 %

## 2024-08-09 LAB
GLUCOSE BLDC GLUCOMTR-MCNC: 172 MG/DL — HIGH (ref 70–99)
GLUCOSE BLDC GLUCOMTR-MCNC: 84 MG/DL — SIGNIFICANT CHANGE UP (ref 70–99)
GLUCOSE BLDC GLUCOMTR-MCNC: 95 MG/DL — SIGNIFICANT CHANGE UP (ref 70–99)

## 2024-08-09 PROCEDURE — 85027 COMPLETE CBC AUTOMATED: CPT

## 2024-08-09 PROCEDURE — 83735 ASSAY OF MAGNESIUM: CPT

## 2024-08-09 PROCEDURE — 84100 ASSAY OF PHOSPHORUS: CPT

## 2024-08-09 PROCEDURE — 80053 COMPREHEN METABOLIC PANEL: CPT

## 2024-08-09 PROCEDURE — 36415 COLL VENOUS BLD VENIPUNCTURE: CPT

## 2024-08-09 PROCEDURE — L8699: CPT

## 2024-08-09 PROCEDURE — 82962 GLUCOSE BLOOD TEST: CPT

## 2024-08-09 PROCEDURE — 74177 CT ABD & PELVIS W/CONTRAST: CPT | Mod: MC

## 2024-08-09 PROCEDURE — 99239 HOSP IP/OBS DSCHRG MGMT >30: CPT

## 2024-08-09 PROCEDURE — 85025 COMPLETE CBC W/AUTO DIFF WBC: CPT

## 2024-08-09 PROCEDURE — 80048 BASIC METABOLIC PNL TOTAL CA: CPT

## 2024-08-09 RX ORDER — ACETAMINOPHEN 500 MG
5 TABLET ORAL
Qty: 1 | Refills: 0
Start: 2024-08-09

## 2024-08-09 RX ORDER — LORATADINE 10 MG
17 TABLET,DISINTEGRATING ORAL
Refills: 0 | DISCHARGE

## 2024-08-09 RX ORDER — ONDANSETRON HCL/PF 4 MG/2 ML
1 VIAL (ML) INJECTION
Refills: 0 | DISCHARGE

## 2024-08-09 RX ORDER — OMEPRAZOLE 100 %
1 POWDER (GRAM) MISCELLANEOUS
Refills: 0 | DISCHARGE

## 2024-08-09 RX ORDER — ACETAMINOPHEN 500 MG
2 TABLET ORAL
Refills: 0 | DISCHARGE

## 2024-08-09 RX ADMIN — Medication 4 MILLIGRAM(S): at 15:19

## 2024-08-09 RX ADMIN — ENOXAPARIN SODIUM 40 MILLIGRAM(S): 120 INJECTION SUBCUTANEOUS at 05:46

## 2024-08-09 NOTE — PROGRESS NOTE ADULT - ASSESSMENT
63F with a history of lung cancer (currently receiving experimental therapy) who presented for an outpatient PEG tube placement. She is now admitted to medicine for further monitoring while tube feeds are initiated, and home care is arranged.     Stage 4 metastatic NSCLC, adenocarcinoma  - On protocol , LD 7/31. No treatment while admitted.   - Follows with Dr. Stanford at [facility] [place]. Previously had POD on multiple lines of therapy (osimertinib, pemetrexed, carboplatin and bevacizumab, weekly [place], docetaxel w/ afatinib)  - S/p EGD with peg placement 8/5, on TF  - CT a/p w/ Marked solid stool burden distends the rectosigmoid and descending colon with mixed fluid and solid feces throughout the remaining colon. No bowel obstruction. Findings suggest delayed gastrointestinal transit. Enhancing soft tissue adjacent to liver margin concerning for carcinomatosis. Small to moderate ascites. ([facility] to review said images)    Patient cleared for discharge from a heme-onc perspective    Natty Hilliard NP  Hematology/ Oncology  [facility]  [phone] (office)  [phone] (alt office)  Evenings and weekends please call MD on call or office  
Impression:    63 yrs old female w/ hx lung cancer c/w malignant ascites s/p peritoneal drain placement (currently on experimental therapy) who presented for o/p PEG tube placement, will need nutrition consult and home care prior to discharge    #PEG placement  #Severe malnutrition  #Lung cancer c/w malignant ascites s/p Pleurx catheter   s/p EGD with PEG placement on 8/5 with no complications. External bumper at 1.5cm, kept the bumper tight to the skin, compressing it to avoid leakage especially in the setting of her malignancy ascites. Able to flush the tube with no resistance.   - Tolerating PO clears and tube feeds.     Recommendations:   - Appreciate nutrition consult.   - Planned to be discharged when the patient is able to tolerate tube feeds at goal rate.   - Can also proceed w/ oral intake if the patient is able to tolerate it.   - Patient has a follow up appointment scheduled with Dr. Connolly on 8/13/2024 at 3pm. Informed the patient as well.   - Patient is safe to be discharged from GI perspective.     Discussed the case with Dr. Connolly.     GI will plan to sign off at this time. Please feel free to reach out to our team with any follow up questions. Please provide patient with Gastroenterology Clinic number to confirm/arrange appointment; [phone] ([facility] at [address] [place] [address]) or [phone] ([facility] at [address] [place] [address]) or [phone] ([facility] at [address] [facility]).    All recommendations are tentative until the note is attested by an attending.     David Juarez, PGY-6  Gastroenterology/Hepatology Fellow  Available on [employer] Teams  [phone] (Short Range Pager)  [phone] (Long Range Pager)    After 5pm, please contact the on-call GI fellow.       
Impression:    63 yrs old female w/ hx lung cancer c/w malignant ascites s/p peritoneal drain placement (currently on experimental therapy) who presented for o/p PEG tube placement, will need nutrition consult and home care prior to discharge    #PEG placement  #Severe malnutrition  #Lung cancer c/w malignant ascites s/p Pleurx catheter   s/p EGD with PEG placement on 8/5 with no complications. External bumper at 1.5cm, kept the bumper tight to the skin, compressing it to avoid leakage especially in the setting of her malignancy ascites. Able to flush the tube with no resistance.   - Tolerating PO clears and tube feeds.   - CT A/P showed no acute concerns.     Recommendations:   - Recommend tube feeds during the day which she is sitting upright.   - Can also proceed w/ oral intake if the patient is able to tolerate it.   - Recommend bowel regimen, Miralax BID as she has significant stool burden on imaging which can also contribute to nausea.   - Would also recommend PO omeprazole 20 mg daily.   - Patient has a follow up appointment scheduled with Dr. Connolly on 8/13/2024 at 3pm. Informed the patient as well.   - Safe to be discharged from GI perspective.     Discussed the case with Dr. Connolly.       All recommendations are tentative until note is attested by an attending.     David Juarez, PGY-6  Gastroenterology/Hepatology Fellow  Available on Microsoft Teams  [phone] (Short Range Pager)  [phone] (Long Range Pager)    After 5pm, please contact the on-call GI fellow.    
Impression:    63 yrs old female w/ hx lung cancer c/w malignant ascites s/p peritoneal drain placement (currently on experimental therapy) who presented for o/p PEG tube placement, will need nutrition consult and home care prior to discharge    #PEG placement  #Severe malnutrition  #Lung cancer c/w malignant ascites s/p Pleurx catheter   s/p EGD with PEG placement on 8/5 with no complications. External bumper at 1.5cm, kept the bumper tight to the skin, compressing it to avoid leakage especially in the setting of her malignancy ascites. Able to flush the tube with no resistance.   - Tolerating PO clears.     Recommendations:   - Please repeat CBC, as the hgb trended down but she has no overt signs of GI bleeding at this time.   - Can start her on tube feeds.   - Will need nutrition consult to optimize her calorie intake.   - Can also proceed w/ oral intake if the patient is able to tolerate it.   - Will arrange for a follow up appt in one week with Dr. Connolly for PEG check.   - If the CBC is stable, can consider d/c today, safe from GI perspective.     All recommendations are tentative until note is attested by an attending.     David Juarez, PGY-6  Gastroenterology/Hepatology Fellow  Available on Microsoft Teams  [phone] (Short Range Pager)  [phone] (Long Range Pager)    After 5pm, please contact the on-call GI fellow.
63F with a history of lung cancer (currently receiving experimental therapy) who presented for an outpatient PEG tube placement. She is now admitted to medicine for further monitoring while tube feeds are initiated, and home care is arranged.     Stage 4 metastatic NSCLC, adenocarcinoma  - On protocol , LD 7/31. No treatment while admitted.   - [place] [facility] [place] Previously had POD on multiple lines of therapy (osimertinib, pemetrexed, carboplatin and bevacizumab, weekly [place], docetaxel w/ afatinib)  - S/p EGD with peg placement 8/5, on TF  - awaiting CT a/p for PEG evaluation d/t ongoing abdominal discomfort    Will continue to follow.    Natty Hilliard NP  Hematology/ Oncology  [facility]  [phone] (office)  [phone] (alt office)  Evenings and weekends please call MD on call or office  
Impression:    63 yrs old female w/ hx lung cancer c/w malignant ascites s/p peritoneal drain placement (currently on experimental therapy) who presented for o/p PEG tube placement, will need nutrition consult and home care prior to discharge    #PEG placement  #Severe malnutrition  #Lung cancer c/w malignant ascites s/p Pleurx catheter   s/p EGD with PEG placement on 8/5 with no complications. External bumper at 1.5cm, kept the bumper tight to the skin, compressing it to avoid leakage especially in the setting of her malignancy ascites. Able to flush the tube with no resistance.   - Tolerating PO clears and tube feeds.   - had mild pain at the peg tube site, will obtain CT A/P w/ IV contrast, bumper was loosened at 2 cm.     Recommendations:   - CT A/P w/ IV con pending.   - Can also proceed w/ oral intake if the patient is able to tolerate it.   - Patient has a follow up appointment scheduled with Dr. Connolly on 8/13/2024 at 3pm. Informed the patient as well.   - If the CT imaging is stable and she is able to tolerate tube feeds at goal rate, safe to be discharged from GI perspective.     Discussed the case with Dr. Connolly.     All recommendations are tentative until note is attested by an attending.     David Juarez, PGY-6  Gastroenterology/Hepatology Fellow  Available on Microsoft Teams  [phone] (Short Range Pager)  [phone] (Long Range Pager)    After 5pm, please contact the on-call GI fellow.
63F with a history of lung cancer (currently receiving experimental therapy) who presented for an outpatient PEG tube placement, being monitored for tube feed initiation and home care set up.

## 2024-08-09 NOTE — DISCHARGE NOTE PROVIDER - CARE PROVIDER_API CALL
Manjinder Stanford [address] [address]  Phone: [phone]  Fax: [phone]  Follow Up Time:

## 2024-08-09 NOTE — PROGRESS NOTE ADULT - SUBJECTIVE AND OBJECTIVE BOX
Patient is a 63y old  Female who presents with a chief complaint of Home care set up, initiation of PEG tube feeds (08 Aug 2024 14:53)    Patient seen and examined  Resting in bed, no new complaints     MEDICATIONS  (STANDING):  clindamycin IVPB 600 milliGRAM(s) IV Intermittent once  enoxaparin Injectable 40 milliGRAM(s) SubCutaneous every 24 hours  pantoprazole   Suspension 40 milliGRAM(s) Oral daily    MEDICATIONS  (PRN):  acetaminophen     Tablet .. 650 milliGRAM(s) Oral every 6 hours PRN Temp greater or equal to 38C (100.4F), Mild Pain (1 - 3)  aluminum hydroxide/magnesium hydroxide/simethicone Suspension 30 milliLiter(s) Oral every 4 hours PRN Dyspepsia  melatonin 3 milliGRAM(s) Oral at bedtime PRN Insomnia  ondansetron Injectable 4 milliGRAM(s) IV Push every 8 hours PRN Nausea and/or Vomiting  zolpidem 5 milliGRAM(s) Oral at bedtime PRN Insomnia      Vital Signs Last 24 Hrs  T(C): 36.6 (08 Aug 2024 23:54), Max: 36.6 (08 Aug 2024 23:54)  T(F): 97.8 (08 Aug 2024 23:54), Max: 97.8 (08 Aug 2024 23:54)  HR: 85 (08 Aug 2024 23:54) (85 - 98)  BP: 88/57 (08 Aug 2024 23:54) (88/57 - 97/63)  BP(mean): --  RR: 18 (08 Aug 2024 23:54) (18 - 18)  SpO2: 100% (08 Aug 2024 23:54) (99% - 100%)    Parameters below as of 08 Aug 2024 23:54  Patient On (Oxygen Delivery Method): room air      PE  Awake, alert  Anicteric, MMM  RRR  CTAB  Abd soft, NT, ND  No c/c                        8.4    4.74  )-----------( 198      ( 08 Aug 2024 07:27 )             26.3       08-08    139  |  105  |  10  ----------------------------<  103<H>  4.1   |  25  |  0.68    Ca    8.5      08 Aug 2024 07:25  Mg     1.8     08-08        ACC: [id number] EXAM:  CT ABDOMEN AND PELVIS OC IC   ORDERED BY: SHIRLENE ADAMS     PROCEDURE DATE:  08/08/2024          INTERPRETATION:  CLINICAL INFORMATION: Abdominal pain following PEG   insertion ,  per records history of Stage 4 metastatic NSCLC,   adenocarcinoma, multiple lines of therapy (osimertinib, pemetrexed,   carboplatin and bevacizumab, weekly Eckerman, docetaxel w/  afatinib)    COMPARISON: No prior imaging for comparison.    CONTRAST/COMPLICATIONS:  IV Contrast: Omnipaque 350  90 cc administered   10 cc discarded  Oral Contrast: Omnipaque 300  Complications: None reported at time of study completion    PROCEDURE:  CT of the Abdomen and Pelvis was performed.  Sagittal and coronal reformats were performed.    FINDINGS:  LOWER CHEST: 5 mm right lower lobe groundglass nodule and additional   subtle groundglass nodules in the middle lobe. Calcified granuloma in the   right anterior middle lobe. Left lower lobe pulmonary nodule measures 6   mm with visualization of multiple less than 4 mm left lower lobe   clustered groundglass nodules. Small left more than right pleural   effusion.    LIVER: No discrete intrahepatic focal liver lesion.  BILE DUCTS: Normal caliber.  GALLBLADDER: Contracted gallbladder.  SPLEEN: Normal size. Multiple calcified granuloma..  PANCREAS: Within normal limits. Homogeneous enhancement, no ductal   dilatation.  ADRENALS: Within normal limits.  KIDNEYS/URETERS: No hydronephrosis. Symmetric, homogeneous renal   parenchyma enhancement. Right inferior pole renal cyst.    BLADDER: Minimally distended.  REPRODUCTIVE ORGANS: Uterus and adnexa demonstrate subtle nodularity on   the serosal surface but are otherwise within normal limits.    BOWEL: Stomach is grossly normal in appearance. There is a PEG tube in   place in correct position with appropriate adaptation to the anterior   abdominal wall. Oral contrast was administered and has reached the distal   small bowel. No bowel obstruction. Appendix is not clearly visualized. No   inflammatory changes at the cecal pole. There is substantial stool burden   throughout the colon compatible with constipation. No colonic obstruction.  PERITONEUM/RETROPERITONEUM: Moderate volume ascites in all 4 quadrants.   There is diffuse peritoneal thickening involving the entire peritoneal   cavity with extensive enhancing nodularity in the right subphrenic space.   A larger peritoneal nodularity anterior to the falciform ligament   measures 2.8 x 1.1 cm. Peritoneal thickening, enhancement and nodularity   involve the serosal surfaces of the uterus and liver capsule.   Percutaneous peritoneal catheter entering in the left mid abdomen ends   with the tip in the left lower quadrant. Tiny focus of air in the right   upper quadrant peritoneal cavity, image 3:43 is likely due to the   presence of the catheter and recent PEG placement rather than infection.   No leakage of oral contrast from the PEG tube or stomach.  VESSELS: Hepatic veins, portal vein, SMV, renal veins and IVC are patent.   Normal caliber of the abdominal aorta, celiac axis and SMA are patent.  LYMPH NODES: No lymphadenopathy.  ABDOMINAL WALL: Correct position PEG tube and left abdominal peritoneal   catheter. Cachectic body habitus. Mild subcutaneous edema.  BONES: There is no lytic or blastic bony lesions.    IMPRESSION:  Multiple pulmonary nodules and groundglass nodules in bilateral lung   bases.  These may be infectious, inflammatory or metastatic in etiology.   Comparison with prior imaging is recommended to assess for interval   change.    Moderate ascites in all 4 quadrants with extensive nodularity in the   upper peritoneal cavity most compatible with peritoneal carcinomatosis.    Correct position of the PEG tube without evidence of leakage. Left   percutaneous peritoneal catheter.    No evidence of small or large bowel obstruction. Considerable stool   burden in the colon may relate to constipation. Correlate clinically.      Below preliminary report was acknowledged.      PRELIMINARY IMPRESSION:  Partially imaged chest with scattered nodules and small areas of   atelectasis.    Enhancing soft tissue adjacent to liver margin concerning for   carcinomatosis. Small to moderate ascites.    Left-sided peritoneal drainage catheter terminates in the left pelvis.   PEG tube within the stomach.    Marked solid stool burden distends the rectosigmoid and descending colon   with mixed fluid and solid feces throughout the remaining colon. No bowel   obstruction. Findings suggest delayed gastrointestinal transit.    --- End of Report ---

## 2024-08-09 NOTE — DISCHARGE NOTE PROVIDER - ATTENDING DISCHARGE PHYSICAL EXAMINATION:
PHYSICAL EXAM  Vital Signs Last 24 Hrs  T(C): 36.6 (09 Aug 2024 08:39), Max: 36.6 (08 Aug 2024 23:54)  T(F): 97.9 (09 Aug 2024 08:39), Max: 97.9 (09 Aug 2024 08:39)  HR: 100 (09 Aug 2024 08:39) (85 - 100)  BP: 87/60 (09 Aug 2024 08:39) (87/60 - 91/56)  BP(mean): --  RR: 18 (09 Aug 2024 08:39) (18 - 18)  SpO2: 97% (09 Aug 2024 08:39) (97% - 100%)    Parameters below as of 09 Aug 2024 08:39  Patient On (Oxygen Delivery Method): room air        08-08-24 @ 07:01  -  08-09-24 @ 07:00  --------------------------------------------------------  IN: 0 mL / OUT: 500 mL / NET: -500 mL      CONSTITUTIONAL: Well-groomed, in no apparent distress;  EYES: No conjunctival or scleral injection, non-icteric;  ENMT: No external nasal lesions; Normal outer ears;  NECK: Trachea midline;  RESPIRATORY: Normal respiratory effort;   CARDIOVASCULAR: Regular rate and rhythm;  GASTROINTESTINAL: Non-distended; No palpable masses; No rebound/guarding;  EXTREMITIES:  No lower extremity edema;  NEUROLOGY: A+O to person, place, and time; Does respond to commands appropriately;  PSYCHIATRY: Mood and Affect appropriate

## 2024-08-09 NOTE — PROGRESS NOTE ADULT - PROVIDER SPECIALTY LIST ADULT
Heme/Onc
Gastroenterology
Heme/Onc
Hospitalist

## 2024-08-09 NOTE — DISCHARGE NOTE PROVIDER - NSDCFUSCHEDAPPT_GEN_ALL_CORE_FT
Sean Connolly  [facility]  GASTRO [address]  Scheduled Appointment: 08/13/2024

## 2024-08-09 NOTE — PROGRESS NOTE ADULT - SUBJECTIVE AND OBJECTIVE BOX
Gastroenterology/Hepatology Progress Note      Interval Events:     - CT A/P showed no abnormalities, the PEG tube is in place.   - Reported her abd pain is better.   - Prefer to have her tube feeds during day time as lying down with tube feeds causing nausea and regurgitation with heartburn.   - No vomiting.   - No fevers or chills.     Allergies:  Eggplant (Rash)  No Known Drug Allergies  cefazolin (Nausea)      Hospital Medications:  acetaminophen     Tablet .. 650 milliGRAM(s) Oral every 6 hours PRN  aluminum hydroxide/magnesium hydroxide/simethicone Suspension 30 milliLiter(s) Oral every 4 hours PRN  clindamycin IVPB 600 milliGRAM(s) IV Intermittent once  enoxaparin Injectable 40 milliGRAM(s) SubCutaneous every 24 hours  melatonin 3 milliGRAM(s) Oral at bedtime PRN  ondansetron Injectable 4 milliGRAM(s) IV Push every 8 hours PRN  pantoprazole   Suspension 40 milliGRAM(s) Oral daily  zolpidem 5 milliGRAM(s) Oral at bedtime PRN      ROS: 14 point ROS negative unless otherwise state in subjective    PHYSICAL EXAM:   Vital Signs:  Vital Signs Last 24 Hrs  T(C): 36.6 (09 Aug 2024 08:39), Max: 36.6 (08 Aug 2024 23:54)  T(F): 97.9 (09 Aug 2024 08:39), Max: 97.9 (09 Aug 2024 08:39)  HR: 100 (09 Aug 2024 08:39) (85 - 100)  BP: 87/60 (09 Aug 2024 08:39) (87/60 - 91/56)  BP(mean): --  RR: 18 (09 Aug 2024 08:39) (18 - 18)  SpO2: 97% (09 Aug 2024 08:39) (97% - 100%)    Parameters below as of 09 Aug 2024 08:39  Patient On (Oxygen Delivery Method): room air      Daily     Daily     PHYSICAL EXAM:     GENERAL:  No acute distress, cachectic, chronically ill appearing, lying in bed.   HEENT:  NCAT, no scleral icterus   CHEST:  no respiratory distress  HEART:  Regular rate and rhythm  ABDOMEN:  Soft, has PEG tube in place, external bumper at 3 cm, with no bleeding at the site, mild tenderness at the site, no drainage around the site, non distended, also has peritoneal drain placed in the RUQ.   EXTREMITIES: No LE edema  SKIN:  No rash/erythema/ecchymoses/petechiae/wounds/abscess/warm/dry  NEURO:  Alert and oriented x 3, no tremors.     LABS:                        8.4    4.74  )-----------( 198      ( 08 Aug 2024 07:27 )             26.3       08-08    139  |  105  |  10  ----------------------------<  103<H>  4.1   |  25  |  0.68    Ca    8.5      08 Aug 2024 07:25  Mg     1.8     08-08          Urinalysis Basic - ( 08 Aug 2024 07:25 )    Color: x / Appearance: x / SG: x / pH: x  Gluc: 103 mg/dL / Ketone: x  / Bili: x / Urobili: x   Blood: x / Protein: x / Nitrite: x   Leuk Esterase: x / RBC: x / WBC x   Sq Epi: x / Non Sq Epi: x / Bacteria: x            Imaging:    CT A/P s/p PEG tube placement with oral contrast.     FINDINGS:  LOWER CHEST: 5 mm right lower lobe groundglass nodule and additional   subtle groundglass nodules in the middle lobe. Calcified granuloma in the   right anterior middle lobe. Left lower lobe pulmonary nodule measures 6   mm with visualization of multiple less than 4 mm left lower lobe   clustered groundglass nodules. Small left more than right pleural   effusion.    LIVER: No discrete intrahepatic focal liver lesion.  BILE DUCTS: Normal caliber.  GALLBLADDER: Contracted gallbladder.  SPLEEN: Normal size. Multiple calcified granuloma..  PANCREAS: Within normal limits. Homogeneous enhancement, no ductal   dilatation.  ADRENALS: Within normal limits.  KIDNEYS/URETERS: No hydronephrosis. Symmetric, homogeneous renal   parenchyma enhancement. Right inferiorpole renal cyst.    BLADDER: Minimally distended.  REPRODUCTIVE ORGANS: Uterus and adnexa demonstrate subtle nodularity on   the serosal surface but are otherwise within normal limits.    BOWEL: Stomach is grossly normal in appearance. There is a PEGtube in   place in correct position with appropriate adaptation to the anterior   abdominal wall. Oral contrast was administered and has reached the distal   small bowel. No bowel obstruction. Appendix is not clearly visualized. No   inflammatory changes at the cecal pole. There is substantial stool burden   throughout the colon compatible with constipation. No colonic obstruction.  PERITONEUM/RETROPERITONEUM: Moderate volume ascites in all 4 quadrants.   There is diffuse peritoneal thickening involving the entire peritoneal   cavity with extensive enhancing nodularity in the right subphrenic space.   A larger peritoneal nodularity anterior to the falciform ligament   measures 2.8 x 1.1 cm. Peritoneal thickening, enhancement and nodularity   involve the serosal surfaces of the uterus and liver capsule.   Percutaneous peritoneal catheter entering in the left mid abdomen ends   with the tip in the left lower quadrant. Tiny focus of air in the right   upper quadrant peritoneal cavity, image 3:43 is likely due to the   presence of the catheter and recent PEG placement rather than infection.   No leakage of oral contrast from the PEG tube or stomach.  VESSELS: Hepatic veins, portal vein, SMV, renal veins and IVC are patent.   Normal caliber of the abdominal aorta, celiac axis and SMA are patent.  LYMPH NODES: No lymphadenopathy.  ABDOMINAL WALL: Correct position PEG tube and left abdominal peritoneal   catheter. Cachectic body habitus. Mild subcutaneous edema.  BONES: There is no lyticor blastic bony lesions.    IMPRESSION:  Multiple pulmonary nodules and groundglass nodules in bilateral lung   bases.  These may be infectious, inflammatory or metastatic in etiology.   Comparison with prior imaging is recommended to assess for interval   change.    Moderate ascites in all 4 quadrants with extensive nodularity in the   upper peritoneal cavity most compatible with peritoneal carcinomatosis.    Correct position of the PEG tube without evidence of leakage. Left   percutaneous peritoneal catheter.    No evidence of small or large bowel obstruction. Considerable stool   burden in the colon may relate to constipation. Correlate clinically.    EGD on 8/5      Impression:          - Gastroesophageal flap valve classified as Hill Grade IV (no fold, wide open                        lumen, hiatal hernia present).                       - 3 cm hiatal hernia.                       - Normal stomach.                       - Normal first portion of the duodenum.                       - Duodenal mucosal atrophy.                       - An externally removable 20 Fr PEG placement was successfully completed.                       - No specimens collected.

## 2024-08-09 NOTE — DISCHARGE NOTE PROVIDER - HOSPITAL COURSE
HPI:  63F with a history of lung cancer (currently receiving experimental therapy) who presented for an outpatient PEG tube placement. She is now admitted to medicine for further monitoring while tube feeds are initiated, and home care is arranged. She reports that she had been undergoing rapid weight loss (~20 lb over the past few months), and her oncologist had suggested she get a PEG tube. She reports pain with swallowing, though denies dysphagia. States she normally eats soft foods and is able to swallow water without difficulty. She was diagnosed with lung cancer in 2021 - she has progressed through 3 lines of therapy and is now on an experimental agent. She's unsure if it is metastatic. She reports placement of a PleurX in the setting of malignant ascites - states that she drains ~500ccs of fluid daily (reportedly there is more fluid remaining, though her providers have advised her to not drain more than 500 ccs). She denies acute complaints at this time.    Hospital Course:   The patient was admitted to medicine for PEG placement. The PEG was placed and the patient was then started on the tube feed without issue. The patient was also allowed to eat by mouth if she wanted to.     On day of discharge, patient is clinically stable with no new exam findings or acute symptoms compared to prior. The patient was seen by the attending physician on the date of discharge and deemed stable and acceptable for discharge. The patient's chronic medical conditions were treated accordingly per the patient's home medication regimen. The patient's medication reconciliation (with changes made to chronic medications), follow up appointments, discharge orders, instructions, and significant lab and diagnostic studies are as noted.      Important Medication Changes and Reason:  -none     Active of Pending issues Requiring Follow up:  -f/u with GI     Advanced Directives:  none    Discharge Diagnoses:  PEG tube placement

## 2024-08-09 NOTE — PROGRESS NOTE ADULT - REASON FOR ADMISSION
Home care set up, initiation of PEG tube feeds

## 2024-08-09 NOTE — DISCHARGE NOTE PROVIDER - NSDCCPCAREPLAN_GEN_ALL_CORE_FT
PRINCIPAL DISCHARGE DIAGNOSIS  Diagnosis: Adult failure to thrive  Assessment and Plan of Treatment: You admitted to the hospital to have your PEG placement. The GI doctors placed the PEG tube without issue and then you were started on tube feeds through the PEG also without issue. You are also allowed to through the mouth as long as you recieved additional means of nutrition through the PEG tube. Please follow up with your PCP and the GI doctor. Please return to the ED if your experience any new or worsening symptoms.

## 2024-08-09 NOTE — PROGRESS NOTE ADULT - NS ATTEND AMEND GEN_ALL_CORE FT
Agree with above assessment and plan.   Stage IV NSCLC under the care of Dr Manjinder Stanford. Currently on clinical trial. No plans for treatment inpatient. Here for PEG placement which was done. Regarding CT findings- can follow up outpatient with short interval scan and continue cancer directed therapy with Dr Stanford.    Will need follow up at [facility] after discharge.

## 2024-08-09 NOTE — DISCHARGE NOTE NURSING/CASE MANAGEMENT/SOCIAL WORK - PATIENT PORTAL LINK FT
You can access the FollowMyHealth Patient Portal offered by [facility] by registering at the following website: [email or web address]. By joining [facility]’s FollowMyHealth portal, you will also be able to view your health information using other applications (apps) compatible with our system.

## 2024-08-09 NOTE — DISCHARGE NOTE PROVIDER - NSDCFUADDAPPT_GEN_ALL_CORE_FT
APPTS ARE READY TO BE MADE: [ ] YES    Best Family or Patient Contact (if needed):    Additional Information about above appointments (if needed):    1: PCP Dr. Manjinder Stanford in 2 weeks   2:   3:     Other comments or requests:

## 2024-08-09 NOTE — DISCHARGE NOTE PROVIDER - NSDCMRMEDTOKEN_GEN_ALL_CORE_FT
Ambien 5 mg oral tablet: 1 tab(s) orally once a day (at bedtime) as needed for  insomnia  metoclopramide 10 mg oral tablet: 1 tab(s) orally every 6 hours as needed for  nausea  MiraLax oral powder for reconstitution: 17 gram(s) orally once a day as needed for  constipation  omeprazole 40 mg oral delayed release capsule: 1 cap(s) orally once a day  ondansetron 8 mg oral tablet: 1 tab(s) orally every 8 hours as needed for  nausea  senna: 2 tablet with sensor once a day as needed for  constipation  Tums 500 mg oral tablet, chewable: 1 tab(s) chewed once a day  Tylenol 500 mg oral tablet: 2 tab(s) orally every 6 hours   Ambien 5 mg oral tablet: 1 tab(s) orally once a day (at bedtime) as needed for  insomnia  metoclopramide 10 mg oral tablet: 1 tab(s) orally every 6 hours as needed for  nausea  omeprazole 40 mg oral delayed release capsule: 1 cap(s) orally once a day  senna: 2 tablet with sensor once a day as needed for  constipation  Tums 500 mg oral tablet, chewable: 1 tab(s) chewed once a day  Tylenol 500 mg oral tablet: 2 tab(s) orally every 6 hours

## 2024-08-13 ENCOUNTER — APPOINTMENT (OUTPATIENT)
Dept: GASTROENTEROLOGY | Facility: CLINIC | Age: 64
End: 2024-08-13
Payer: COMMERCIAL

## 2024-08-13 DIAGNOSIS — Z93.1 GASTROSTOMY STATUS: ICD-10-CM

## 2024-08-13 DIAGNOSIS — R18.0 MALIGNANT ASCITES: ICD-10-CM

## 2024-08-13 DIAGNOSIS — R13.12 DYSPHAGIA, OROPHARYNGEAL PHASE: ICD-10-CM

## 2024-08-13 PROCEDURE — 99213 OFFICE O/P EST LOW 20 MIN: CPT

## 2024-08-18 PROBLEM — Z93.1 S/P PERCUTANEOUS ENDOSCOPIC GASTROSTOMY (PEG) TUBE PLACEMENT: Status: ACTIVE | Noted: 2024-08-18

## 2024-08-18 NOTE — HISTORY OF PRESENT ILLNESS
[FreeTextEntry1] : Referred for PEG for malnutrition, weight loss in setting of advanced malignancy Malignant ascites with drain in place  Received outpatient upper endoscopy with PEG placement was subsequently observed for observation as planned, nursing teaching, and nutrition conculst Patient had moderate discomfort at the G-tube site postprocedure, with gradual mild improvement during the first 2 days.  On the second day, the PEG tube external bumper was loosened from 1.5 cm from the skin to 2 cm from the skin.  She had increase in pain at the G-tube site after this.  CT scan demonstrated manage G-tube was in place without visible problems.  Pain has since improved.  She rates this as mild discomfort.  No bleeding or leakage.  No fever or melena.  EGD/PEG: Hill Grade IV hiatal hernia, 3 cm 20 Fr externally removable PEG placed.

## 2024-08-18 NOTE — ASSESSMENT
[FreeTextEntry1] : Impression:  #1. Referred for PEG for malnutrition, weight loss in setting of advanced lung cancer on treatment.  Now s/p PEG tube  on 8/5/24 with post procedure observation, nutrition consult, and nursing teaching.  CT scan for pain postprocedure at G-tube site was reassuring for no visible G-tube complication.  Mild discomfort at G-tube site, external bumper at 2 cm from the skin.  #2. Malignant ascites with drain  Recommendations:  #1.  Nutrition management per [facility].  #2.  Followup as necessary with me for G-tube management.  No appointment made.

## 2024-08-18 NOTE — CONSULT LETTER
[Dear  ___] : Dear  [unfilled], [Consult Letter:] : I had the pleasure of evaluating your patient, [unfilled]. [Please see my note below.] : Please see my note below. [Consult Closing:] : Thank you very much for allowing me to participate in the care of this patient.  If you have any questions, please do not hesitate to contact me. [Sincerely,] : Sincerely, [FreeTextEntry3] : Sean Connolly MD, MPH, ELIDA, AARONG Chief of Clinical Quality in Gastroenterology, [facility] Associate Chief of Gastroenterology, [facility] Interim Director of Endoscopy, [address], [address] [facility], [id number] 24 hours [phone]  [DrWood  ___] : Dr. BRIAN

## 2024-08-18 NOTE — PHYSICAL EXAM
[de-identified] : Soft, nondistended, very mild tenderness at G-tbue stie without bleeding, erythema, or swelling or leakage.

## 2024-09-27 ENCOUNTER — EMERGENCY (EMERGENCY)
Facility: HOSPITAL | Age: 64
LOS: 1 days | Discharge: HOME | End: 2024-09-27
Attending: EMERGENCY MEDICINE
Payer: COMMERCIAL

## 2024-09-27 VITALS
HEIGHT: 61 IN | SYSTOLIC BLOOD PRESSURE: 87 MMHG | WEIGHT: 78.04 LBS | OXYGEN SATURATION: 100 % | DIASTOLIC BLOOD PRESSURE: 68 MMHG | RESPIRATION RATE: 20 BRPM | HEART RATE: 115 BPM | TEMPERATURE: 98 F

## 2024-09-27 VITALS
RESPIRATION RATE: 19 BRPM | TEMPERATURE: 99 F | DIASTOLIC BLOOD PRESSURE: 62 MMHG | HEART RATE: 102 BPM | SYSTOLIC BLOOD PRESSURE: 95 MMHG | OXYGEN SATURATION: 99 %

## 2024-09-27 DIAGNOSIS — Z98.890 OTHER SPECIFIED POSTPROCEDURAL STATES: Chronic | ICD-10-CM

## 2024-09-27 DIAGNOSIS — Z95.828 PRESENCE OF OTHER VASCULAR IMPLANTS AND GRAFTS: Chronic | ICD-10-CM

## 2024-09-27 DIAGNOSIS — D17.9 BENIGN LIPOMATOUS NEOPLASM, UNSPECIFIED: Chronic | ICD-10-CM

## 2024-09-27 LAB
ALBUMIN SERPL ELPH-MCNC: 2.2 G/DL — LOW (ref 3.3–5)
ALP SERPL-CCNC: 68 U/L — SIGNIFICANT CHANGE UP (ref 40–120)
ALT FLD-CCNC: 7 U/L — LOW (ref 10–45)
ANION GAP SERPL CALC-SCNC: 12 MMOL/L — SIGNIFICANT CHANGE UP (ref 5–17)
ANISOCYTOSIS BLD QL: SLIGHT — SIGNIFICANT CHANGE UP
APTT BLD: 27.5 SEC — SIGNIFICANT CHANGE UP (ref 24.5–35.6)
AST SERPL-CCNC: 9 U/L — LOW (ref 10–40)
BASOPHILS # BLD AUTO: 0 K/UL — SIGNIFICANT CHANGE UP (ref 0–0.2)
BASOPHILS NFR BLD AUTO: 0 % — SIGNIFICANT CHANGE UP (ref 0–2)
BILIRUB SERPL-MCNC: 0.1 MG/DL — LOW (ref 0.2–1.2)
BUN SERPL-MCNC: 31 MG/DL — HIGH (ref 7–23)
CALCIUM SERPL-MCNC: 7.9 MG/DL — LOW (ref 8.4–10.5)
CHLORIDE SERPL-SCNC: 101 MMOL/L — SIGNIFICANT CHANGE UP (ref 96–108)
CO2 SERPL-SCNC: 16 MMOL/L — LOW (ref 22–31)
CREAT SERPL-MCNC: 0.99 MG/DL — SIGNIFICANT CHANGE UP (ref 0.5–1.3)
EGFR: 64 ML/MIN/1.73M2 — SIGNIFICANT CHANGE UP
ELLIPTOCYTES BLD QL SMEAR: SLIGHT — SIGNIFICANT CHANGE UP
EOSINOPHIL # BLD AUTO: 0 K/UL — SIGNIFICANT CHANGE UP (ref 0–0.5)
EOSINOPHIL NFR BLD AUTO: 0 % — SIGNIFICANT CHANGE UP (ref 0–6)
FLUAV AG NPH QL: SIGNIFICANT CHANGE UP
FLUBV AG NPH QL: SIGNIFICANT CHANGE UP
GAS PNL BLDV: SIGNIFICANT CHANGE UP
GLUCOSE SERPL-MCNC: 75 MG/DL — SIGNIFICANT CHANGE UP (ref 70–99)
HCT VFR BLD CALC: 37.2 % — SIGNIFICANT CHANGE UP (ref 34.5–45)
HGB BLD-MCNC: 11.5 G/DL — SIGNIFICANT CHANGE UP (ref 11.5–15.5)
INR BLD: 1.39 RATIO — HIGH (ref 0.85–1.16)
LIDOCAIN IGE QN: 23 U/L — SIGNIFICANT CHANGE UP (ref 7–60)
LYMPHOCYTES # BLD AUTO: 0.09 K/UL — LOW (ref 1–3.3)
LYMPHOCYTES # BLD AUTO: 4.4 % — LOW (ref 13–44)
MACROCYTES BLD QL: SLIGHT — SIGNIFICANT CHANGE UP
MANUAL SMEAR VERIFICATION: SIGNIFICANT CHANGE UP
MCHC RBC-ENTMCNC: 26.5 PG — LOW (ref 27–34)
MCHC RBC-ENTMCNC: 30.9 GM/DL — LOW (ref 32–36)
MCV RBC AUTO: 85.7 FL — SIGNIFICANT CHANGE UP (ref 80–100)
MONOCYTES # BLD AUTO: 0.09 K/UL — SIGNIFICANT CHANGE UP (ref 0–0.9)
MONOCYTES NFR BLD AUTO: 4.4 % — SIGNIFICANT CHANGE UP (ref 2–14)
NEUTROPHILS # BLD AUTO: 1.81 K/UL — SIGNIFICANT CHANGE UP (ref 1.8–7.4)
NEUTROPHILS NFR BLD AUTO: 84.2 % — HIGH (ref 43–77)
NEUTS BAND # BLD: 6.1 % — SIGNIFICANT CHANGE UP (ref 0–8)
OVALOCYTES BLD QL SMEAR: SLIGHT — SIGNIFICANT CHANGE UP
PLAT MORPH BLD: NORMAL — SIGNIFICANT CHANGE UP
PLATELET # BLD AUTO: 107 K/UL — LOW (ref 150–400)
POIKILOCYTOSIS BLD QL AUTO: SLIGHT — SIGNIFICANT CHANGE UP
POTASSIUM SERPL-MCNC: 4.7 MMOL/L — SIGNIFICANT CHANGE UP (ref 3.5–5.3)
POTASSIUM SERPL-SCNC: 4.7 MMOL/L — SIGNIFICANT CHANGE UP (ref 3.5–5.3)
PROT SERPL-MCNC: 4.5 G/DL — LOW (ref 6–8.3)
PROTHROM AB SERPL-ACNC: 15.9 SEC — HIGH (ref 9.9–13.4)
RBC # BLD: 4.34 M/UL — SIGNIFICANT CHANGE UP (ref 3.8–5.2)
RBC # FLD: 20.3 % — HIGH (ref 10.3–14.5)
RBC BLD AUTO: ABNORMAL
RSV RNA NPH QL NAA+NON-PROBE: SIGNIFICANT CHANGE UP
SARS-COV-2 RNA SPEC QL NAA+PROBE: SIGNIFICANT CHANGE UP
SCHISTOCYTES BLD QL AUTO: SLIGHT — SIGNIFICANT CHANGE UP
SODIUM SERPL-SCNC: 129 MMOL/L — LOW (ref 135–145)
VARIANT LYMPHS # BLD: 0.9 % — SIGNIFICANT CHANGE UP (ref 0–6)
WBC # BLD: 2 K/UL — LOW (ref 3.8–10.5)
WBC # FLD AUTO: 2 K/UL — LOW (ref 3.8–10.5)

## 2024-09-27 PROCEDURE — 84295 ASSAY OF SERUM SODIUM: CPT

## 2024-09-27 PROCEDURE — 85730 THROMBOPLASTIN TIME PARTIAL: CPT

## 2024-09-27 PROCEDURE — 71045 X-RAY EXAM CHEST 1 VIEW: CPT | Mod: 26

## 2024-09-27 PROCEDURE — 85018 HEMOGLOBIN: CPT

## 2024-09-27 PROCEDURE — 82803 BLOOD GASES ANY COMBINATION: CPT

## 2024-09-27 PROCEDURE — 96376 TX/PRO/DX INJ SAME DRUG ADON: CPT

## 2024-09-27 PROCEDURE — 93005 ELECTROCARDIOGRAM TRACING: CPT

## 2024-09-27 PROCEDURE — 87637 SARSCOV2&INF A&B&RSV AMP PRB: CPT

## 2024-09-27 PROCEDURE — 80053 COMPREHEN METABOLIC PANEL: CPT

## 2024-09-27 PROCEDURE — 96375 TX/PRO/DX INJ NEW DRUG ADDON: CPT

## 2024-09-27 PROCEDURE — 71045 X-RAY EXAM CHEST 1 VIEW: CPT

## 2024-09-27 PROCEDURE — 82435 ASSAY OF BLOOD CHLORIDE: CPT

## 2024-09-27 PROCEDURE — 99285 EMERGENCY DEPT VISIT HI MDM: CPT | Mod: 25

## 2024-09-27 PROCEDURE — 82330 ASSAY OF CALCIUM: CPT

## 2024-09-27 PROCEDURE — 87040 BLOOD CULTURE FOR BACTERIA: CPT

## 2024-09-27 PROCEDURE — 96374 THER/PROPH/DIAG INJ IV PUSH: CPT

## 2024-09-27 PROCEDURE — 36569 INSJ PICC 5 YR+ W/O IMAGING: CPT

## 2024-09-27 PROCEDURE — 84132 ASSAY OF SERUM POTASSIUM: CPT

## 2024-09-27 PROCEDURE — 85025 COMPLETE CBC W/AUTO DIFF WBC: CPT

## 2024-09-27 PROCEDURE — 85610 PROTHROMBIN TIME: CPT

## 2024-09-27 PROCEDURE — 83690 ASSAY OF LIPASE: CPT

## 2024-09-27 PROCEDURE — C1751: CPT

## 2024-09-27 PROCEDURE — 99291 CRITICAL CARE FIRST HOUR: CPT

## 2024-09-27 PROCEDURE — 82947 ASSAY GLUCOSE BLOOD QUANT: CPT

## 2024-09-27 PROCEDURE — 85014 HEMATOCRIT: CPT

## 2024-09-27 PROCEDURE — 83605 ASSAY OF LACTIC ACID: CPT

## 2024-09-27 RX ORDER — METOCLOPRAMIDE HCL 5 MG
10 TABLET ORAL
Qty: 1 | Refills: 0
Start: 2024-09-27 | End: 2024-10-10

## 2024-09-27 RX ORDER — SODIUM CHLORIDE 9 MG/ML
1000 INJECTION INTRAMUSCULAR; INTRAVENOUS; SUBCUTANEOUS ONCE
Refills: 0 | Status: COMPLETED | OUTPATIENT
Start: 2024-09-27 | End: 2024-09-27

## 2024-09-27 RX ORDER — ONDANSETRON 2 MG/ML
4 INJECTION, SOLUTION INTRAMUSCULAR; INTRAVENOUS ONCE
Refills: 0 | Status: COMPLETED | OUTPATIENT
Start: 2024-09-27 | End: 2024-09-27

## 2024-09-27 RX ORDER — ONDANSETRON 2 MG/ML
4 INJECTION, SOLUTION INTRAMUSCULAR; INTRAVENOUS
Qty: 2 | Refills: 0
Start: 2024-09-27 | End: 2024-10-10

## 2024-09-27 RX ORDER — METOCLOPRAMIDE HCL 5 MG
10 TABLET ORAL ONCE
Refills: 0 | Status: COMPLETED | OUTPATIENT
Start: 2024-09-27 | End: 2024-09-27

## 2024-09-27 RX ADMIN — SODIUM CHLORIDE 1000 MILLILITER(S): 9 INJECTION INTRAMUSCULAR; INTRAVENOUS; SUBCUTANEOUS at 15:59

## 2024-09-27 RX ADMIN — SODIUM CHLORIDE 1000 MILLILITER(S): 9 INJECTION INTRAMUSCULAR; INTRAVENOUS; SUBCUTANEOUS at 17:24

## 2024-09-27 RX ADMIN — Medication 10 MILLIGRAM(S): at 15:58

## 2024-09-27 RX ADMIN — Medication 10 MILLIGRAM(S): at 17:23

## 2024-09-27 RX ADMIN — ONDANSETRON 4 MILLIGRAM(S): 2 INJECTION, SOLUTION INTRAMUSCULAR; INTRAVENOUS at 16:38

## 2024-09-27 NOTE — ED PROVIDER NOTE - CROS ED GI ALL NEG
forceps and excised with small sharp scissors, hemostasis with AgNO3 and covered with 2x2 gauze and neosporin, care instructions give, specimens to pathology                                  Physical Exam  Genitourinary:                                 Results reviewed today:    No results found for this visit on 02/20/20. Assessment and Plan          Diagnosis Orders   1. Skin tag of vulva  65051 - [place] REMOVAL OF SKIN TAGS, UP TO 15    Surgical Pathology    Surgical Pathology       await path      I am having Elysia Frazier maintain her norgestimate-ethinyl estradiol and escitalopram. We will continue to administer etonogestrel. Return in about 2 weeks (around 3/5/2020) for recheck. There are no Patient Instructions on file for this visit. Over 50% of time spent on counseling and care coordination on: see assessment and plan,  She was also counseled on her preventative health maintenance recommendations and follow-up.         FF time: 15 min      Marietta Richardson,2/21/2020 5:10 PM
- - -

## 2024-09-27 NOTE — ED ADULT TRIAGE NOTE - CHIEF COMPLAINT QUOTE
pt being tx for lung ca last tx was last week c/o feeling tired/weak since yesterday with nausea unable to tolerate PEG tube feedings.

## 2024-09-27 NOTE — ED PROVIDER NOTE - NSFOLLOWUPINSTRUCTIONS_ED_ALL_ED_FT
- Follow up with your doctor in 1 week - bring copies of your results if you were given. If you do not have a primary doctor, please call [phone] to find one convenient for you    - Return to the ED for any new, worsening, or concerning symptoms to you    - Continue all prescribed medications    - In addition, anti-nausea medications were sent to your pharmacy    - Rest and keep yourself hydrated with fluids      Nausea is the feeling that you have to vomit. As nausea gets worse, it can lead to vomiting. Vomiting puts you at an increased risk for dehydration. Older adults and people with other diseases or a weak immune system are at higher risk for dehydration. Drink clear fluids in small but frequent amounts as tolerated. Eat bland, easy-to-digest foods in small amounts as tolerated.    SEEK IMMEDIATE MEDICAL CARE IF YOU HAVE ANY OF THE FOLLOWING SYMPTOMS: fever, inability to keep sufficient fluids down, black or bloody vomitus, black or bloody stools, lightheadedness/dizziness, chest pain, severe headache, rash, shortness of breath, cold or clammy skin, confusion, pain with urination, or any signs of dehydration.

## 2024-09-27 NOTE — CONSULT NOTE ADULT - NS ATTEND AMEND GEN_ALL_CORE FT
Agree with above assessment and plan. Further recommendations based on initial work up and evaluation in ED. Will follow if patient admitted

## 2024-09-27 NOTE — CONSULT NOTE ADULT - ASSESSMENT
63 y/o female with stage 4 metastatic NSCLC, adenocarcinoma, on protocol  LD 9/19, received Neupogen on 9/25. Presenting with nausea, lethargy and weakness.     Stage 4 metastatic NSCLC, adenocarcinoma  - On protocol , LD 9/19. Received Neupogen 9/25.   - [place] [facility] [place] POD on multiple lines of therapy (osimertinib, pemetrexed, carboplatin and bevacizumab, weekly [place], docetaxel w/ afatinib)  - S/p EGD with peg placement 8/5, on TF    Anemia  - Likely in the setting of malignancy, chemotherapy  - 9/23 labs: WBC 6.1, hgb 8.4, platelets 196. 9/25 CBC: WBC 1.57, hgb 7.8, platelets 145.  - Recommend transfusion for hgb < 7    Nausea, back pain  - Recommend antiemetics, pain medicine as needed  - Work up per ED ongoing - eval for dehydration, treatment related effect, infection    Will continue to follow if admitted.    Zack Fry PA-C  Hematology/Oncology  [facility]  [phone] (office)

## 2024-09-27 NOTE — CONSULT NOTE ADULT - SUBJECTIVE AND OBJECTIVE BOX
Reason for consult: stage 4 metastatic NSCLC    HPI: Heme/onc consulted on this 65 y/o female with stage 4 metastatic NSCLC, adenocarcinoma, on protocol  LD 9/19, received Neupogen on 9/25. [place] with [place]. Stanford [place] [facility] [place] 9/23 labs: WBC 6.1, hgb 8.4, platelets 196. 9/25 CBC: WBC 1.57, hgb 7.8, platelets 145. Presenting with nausea, lethargy and weakness.     Previously had POD on osimertinib, pemetrexed and arjun maintenance after chemotherapy with pemetrexed, carboplatin and bevacizumab. Also POD on weekly [place], docetaxel w/ afatinib.    PAST MEDICAL & SURGICAL HISTORY:  Lung cancer      Constipation      Migraine      Infected venous access port      Dysphagia      Severe malnutrition      Diverticulosis      Shoulder mass      Asthma      History of appendectomy      Lipoma      H/O insertion of central venous access port      History of thoracentesis      H/O shoulder surgery          FAMILY HISTORY:  FH: HTN (hypertension) (Father)    FH: diabetes mellitus        Alochol: Denied  Smoking: Nonsmoker  Drug Use: Denied  Marital Status:         Allergies    No Known Drug Allergies  Eggplant (Rash)    Intolerances    cefazolin (Nausea)      MEDICATIONS  (STANDING):  metoclopramide Injectable 10 milliGRAM(s) IV Push once  ondansetron Injectable 4 milliGRAM(s) IV Push once  sodium chloride 0.9% Bolus 1000 milliLiter(s) IV Bolus once    MEDICATIONS  (PRN):      ROS  No fever, sweats, chills  Lethargy, weakness  No epistaxis, HA, sore throat  No CP, SOB, cough, sputum  Nausea, no vomiting/diarrhea  No edema  No rash  No anxiety  + back pain  No bleeding, bruising  No dysuria, hematuria    T(C): 36.4 (09-27-24 @ 14:09), Max: 36.4 (09-27-24 @ 14:09)  HR: 115 (09-27-24 @ 14:09) (115 - 115)  BP: 87/68 (09-27-24 @ 14:09) (87/68 - 87/68)  RR: 20 (09-27-24 @ 14:09) (20 - 20)  SpO2: 100% (09-27-24 @ 14:09) (100% - 100%)  Wt(kg): --    PE  NAD  Awake, alert  Anicteric, MMM  RRR  CTAB  Abd soft, NT, + peg and drain  No c/c/e  No rash grossly

## 2024-09-27 NOTE — ED PROVIDER NOTE - PHYSICAL EXAMINATION
Physical Exam:  Gen: cachetic appearing, appears ni stress   Head: NCAT  HEENT: EOMI, PEERLA, normal conjunctiva, tongue midline, dry oral mucosa   Lung: CTAB, no respiratory distress, no wheezes/rhonchi/rales B/L, speaking in full sentences  CV: RRR, no murmurs, rubs or gallops  Abd: soft, NT, ND, no guarding, no rigidity, no rebound tenderness, no CVA tenderness   MSK: no visible deformities, ROM normal in UE/LE, no back pain  Neuro: No focal sensory or motor deficits  Skin: G-tube and peripheral peritoneal tube in place w/o surrounding erythema or wamrth   Psych: normal affect, calm

## 2024-09-27 NOTE — ED ADULT NURSE REASSESSMENT NOTE - NS ED NURSE REASSESS COMMENT FT1
Pt R sided mediport accessed using sterile technique. Pt tolerated well. + blood return and flushed without difficulty.

## 2024-09-27 NOTE — ED PROVIDER NOTE - PROGRESS NOTE DETAILS
Donis SUNSHINE, PGY-2;  Patient responding to fluid bolus, MAP remains >65, HR is decreasing to high 90s. Patient appears comfortable, asking to eat. Donis SUNSHINE, PGY-2;  Discussed case with patient, plan to send home with prescriptions for IVF, IV zofran and relgan at home. Patient is CDU candidate, coordinating with CM in ED to see if patient can be sent home with these prescriptions tonight. Medications sent to [facility]. KATLYN Gordillo PGY2- ok for dc home, case management to continue working on setting up home health services

## 2024-09-27 NOTE — ED ADULT NURSE NOTE - OBJECTIVE STATEMENT
64-year-old female past medical history metastatic non-small cell lung cancer adenocarcinoma, on experimental chemo at [facility] [place] presenting with nausea and vomiting.  Patient endorses decreased p.o. intake secondary to symptoms, decreased urine output and decreased bowel movements.  Patient states symptoms onset approximately 2 days ago.  Of note patient takes both p.o. and G-tube feeds.  Approximately 1 to 20 cc bolus feeds over an hour, has been unable to tolerate for the last 3 to 4 days. Pt reports mild abd discomfort x 3 days, states "she wouldn't call it pain."  Denies fever, fever, rash, chest pain, shortness of breath, cough, runny nose, sore throat. AOx4, MAEx4, respirations even and unlabored, PEG tube site intact and wnl in abd, skin warm dry and normal for race. Pt also has R sided chest port. Patient safety maintained, bed is in lowest position, wheels locked, and side rails raised. Patient oriented to call bell, and call bell is within reach.

## 2024-09-27 NOTE — ED PROVIDER NOTE - PATIENT PORTAL LINK FT
You can access the FollowMyHealth Patient Portal offered by [facility] by registering at the following website: [email or web address]. By joining [facility]’s FollowMyHealth portal, you will also be able to view your health information using other applications (apps) compatible with our system.

## 2024-09-27 NOTE — CHART NOTE - NSCHARTNOTEFT_GEN_A_CORE
Emergency Room : Patient is a 64 year old  female presented to the ED for poor PO intake, nausea and vomiting. Chart was reviewed. Per chart review patient’s medical history includes metastatic non-small cell lung cancer adenocarcinoma. Patient is being followed by [facility] by Dr. Manjinder SEAY ([phone]).  Per medical team the patient requires home IV infusion of anti-nausea medications.   LMSW introduced herself to  the patient and spouse (Randy Mejia - Phone [phone]) at bedside. Both parties verbalized understanding the role of the . Patient requested  LMSW speak with her  regarding her care. Spouse confirmed the demographic information. LMSW informed both the  ED medical team is recommending to home infusion of Reglan and Zofran.  Patient  and spouse agreed to have the medications administered at home. LMSW explained the referral process including insurance authorization will be pending prior to delivery of the medications. Both acknowledged. CAR met with the ED medical team and requested prescriptions be [place] [facility]. [place] [facility] [place] [facility]. To date CAR has not received notification from [facility] about  the referral. CAR met with Attending MD to provide an update regarding the referral to [facility].  Per medical the patient can be discharged home. [facility] [place] [facility] [place] to follow up with referral.

## 2024-09-27 NOTE — ED PROVIDER NOTE - CHIEF COMPLAINT
The patient is a 64y Female complaining of  The patient is a 64y Female complaining of nausea/vomiting

## 2024-09-27 NOTE — ADVANCED PRACTICE NURSE CONSULT - ASSESSMENT
Midline Catheter Insertion Note    Catheter type: 4F  : Bard  Power Injectable: Yes  Ref#: [id number]  Procedure assisted by: Alisha AVINA RN    Time out was preformed, confirming the patient's first and last name, date of birth, procedure, and correct site prior to state of procedure.  Patient was placed with HOB 30 degrees. Patient placement site was prepped with chlorhexidine solution, then draped using maximum sterile barrier protection. Using the Bard Site Rite 8, the catheter was placed using the Modified Seldinger Technique. The area was injected with 2 ml of 1% lidocaine. Using the ultrasound, the catheter was introduced. Strict adherence to outline aseptic technique including handwashing, glove and gown, utilizing mask and cap, plus draping the patient with a sterile drape was observed. Upon completion of line placement, the insertion site was covered with a sterile occlusive CHG dressing. Pt tolerated procedure well.   All materials used for catheter insertion, including the intact guide wires, were accounted for at the end of the procedure.    Number of attempts: 1  Complications/Comments: none   Emergency Placement: no    Site: new site   Anatomical Site of insertion: L Basilic   Catheter size/length: 4 Fr., 20 cm.   US guided Bard SL Power MIDLINE placed

## 2024-09-27 NOTE — ED ADULT NURSE NOTE - NSFALLRISKINTERV_ED_ALL_ED

## 2024-09-27 NOTE — ED PROVIDER NOTE - ATTENDING CONTRIBUTION TO CARE
Hx: pt with metastatic lung cancer on chemo, recent PEG tube insertion last month, here with  at bedside with lightheadedness, nausea, feeling malaise and fatigue, worsening over past 2 days, unable to tolerate much by mouth NOR via PEG due to severe nausea.  No ab pain, fever, dyspnea, urinary/bowel sxs. Recent blood work 2d ago outpatient, reviewed results, pt with slightly low sodium, low WBC count and received "a shot" for this and hgb 7.6.      PE: pt appears malaised, fatigue, cachectic but nontoxic, no respiratory distress, dry mm, skin intact, psych normal mood and affect, neuro nonfocal.    MDM: hypotensive episode with signs of dehdration with poor intake and reported anemia.... check cbc r/o anemia or leukocytosis, check bmp to r/o metabolic derangement and lyte imbalance, lipase r/o pancreatitis, cxr r/o infiltrate, start iv fluids and antiemetics, consider PRBC transfusion, determine need for inpatient care.

## 2024-09-27 NOTE — ED PROVIDER NOTE - OBJECTIVE STATEMENT
64-year-old female past medical history metastatic non-small cell lung cancer adenocarcinoma, on experimental chemo at [facility] [place] presenting with nausea and vomiting.  Patient endorses decreased p.o. intake secondary to symptoms, decreased urine output and decreased bowel movements.  Patient states symptoms onset approximately 2 days ago.  Of note patient takes both p.o. and G-tube feeds.  Approximately 1 to 20 cc bolus feeds over an hour, has been unable to tolerate for the last 3 to 4 days.  Denies fever, fever, rash, chest pain, shortness of breath, cough, runny nose, sore throat.    Patient has intraperitoneal drain -- drains approximately 400cc daily d/t ascites

## 2024-09-27 NOTE — ED PROVIDER NOTE - CLINICAL SUMMARY MEDICAL DECISION MAKING FREE TEXT BOX
64-year-old female past medical history metastatic non-small cell lung cancer adenocarcinoma, [facility] presenting with nausea and vomiting.  Patient endorses decreased p.o. intake secondary to symptoms, decreased urine output and decreased bowel movements. Patient recieves 120cc bolus feeds of Jevity 1.5KCal daily, however, has been unable to tolerate. On arrival pt. is hypotensive, tachycardic, nonfebrile, saturation >95% on RA. On exam patient appear cachetic, clinically dry mucous membranes, lungs clear to auscultation, no cardiac murmurs, abdomen soft/nt/nd, two drains w/o signs of erythema/warmth to skin, no pitting edema of skin. Will order 1L NS, as patient appears clinically dry. Will order cbc/cmp/vbg/BCx/UCx/UA/CXR/Flu and COVID swab. Will also order reglan and zofran for anti-emetics. Will monitor HD following fluid bolus, will consider peripheral vasopressors vs. blood product if necessary.

## 2024-10-02 LAB
CULTURE RESULTS: SIGNIFICANT CHANGE UP
CULTURE RESULTS: SIGNIFICANT CHANGE UP
SPECIMEN SOURCE: SIGNIFICANT CHANGE UP
SPECIMEN SOURCE: SIGNIFICANT CHANGE UP

## 2025-07-22 NOTE — H&P PST ADULT - NS PRO FEM REPRO HEALTH SCREEN
Biopsy Wound Care Instructions    Keep the bandage in place for 24 hours.   Cleanse the wound with mild soapy water daily  Gently dry the area.  Apply Vaseline or petroleum jelly to the wound using a cotton tipped applicator.  Cover with a clean bandage.  Repeat this process until the biopsy site is healed.  If you had stitches placed, continue treating the site until the stitches are removed. Remember to make an appointment to return to have your stitches removed by our staff.  You may shower and bathe as usual.       ** Biopsy results generally take around 7 business days to come back.  If you have not heard from us by then, please call the office at [phone].    *Please note that biopsy results are released to both the patient and physician at the same time in [place].  Please allow time for your physician to review the results.  One of our staff members will reach out to you with the results and plan.    mammogram

## (undated) DEVICE — CATH IV SAFE BC 20G X 1.16" (PINK)

## (undated) DEVICE — TUBING SUCTION CONN 6FT STERILE

## (undated) DEVICE — FOLEY HOLDER STATLOCK 2 WAY ADULT

## (undated) DEVICE — PACK IV START WITH CHG

## (undated) DEVICE — SUCTION YANKAUER NO CONTROL VENT

## (undated) DEVICE — SYR ALLIANCE II INFLATION 60ML

## (undated) DEVICE — SENSOR O2 FINGER ADULT

## (undated) DEVICE — TUBING SUCTION 20FT

## (undated) DEVICE — SOL INJ NS 0.9% 500ML 2 PORT

## (undated) DEVICE — CATH IV SAFE BC 22G X 1" (BLUE)

## (undated) DEVICE — TUBING IV SET GRAVITY 3Y 100" MACRO

## (undated) DEVICE — BITE BLOCK ADULT 20 X 27MM (GREEN)

## (undated) DEVICE — BALLOON US ENDO